# Patient Record
Sex: FEMALE | Race: WHITE | Employment: OTHER | URBAN - METROPOLITAN AREA
[De-identification: names, ages, dates, MRNs, and addresses within clinical notes are randomized per-mention and may not be internally consistent; named-entity substitution may affect disease eponyms.]

---

## 2017-02-20 ENCOUNTER — OFFICE VISIT (OUTPATIENT)
Dept: INTERNAL MEDICINE CLINIC | Facility: CLINIC | Age: 67
End: 2017-02-20

## 2017-02-20 VITALS
WEIGHT: 184 LBS | BODY MASS INDEX: 32.6 KG/M2 | HEART RATE: 81 BPM | HEIGHT: 63 IN | SYSTOLIC BLOOD PRESSURE: 130 MMHG | DIASTOLIC BLOOD PRESSURE: 80 MMHG

## 2017-02-20 DIAGNOSIS — E78.00 HYPERCHOLESTEROLEMIA: ICD-10-CM

## 2017-02-20 DIAGNOSIS — Z11.59 NEED FOR HEPATITIS C SCREENING TEST: ICD-10-CM

## 2017-02-20 DIAGNOSIS — I10 HYPERTENSION, BENIGN: Primary | ICD-10-CM

## 2017-02-20 LAB
ALBUMIN SERPL BCP-MCNC: 3.7 G/DL (ref 3.5–4.8)
ALBUMIN/GLOB SERPL: 1.6 {RATIO} (ref 1–2)
ALP SERPL-CCNC: 64 U/L (ref 32–100)
ALT SERPL-CCNC: 14 U/L (ref 14–54)
ANION GAP SERPL CALC-SCNC: 8 MMOL/L (ref 0–18)
AST SERPL-CCNC: 23 U/L (ref 15–41)
BILIRUB SERPL-MCNC: 0.5 MG/DL (ref 0.3–1.2)
BUN SERPL-MCNC: 16 MG/DL (ref 8–20)
BUN/CREAT SERPL: 17 (ref 10–20)
CALCIUM SERPL-MCNC: 8.8 MG/DL (ref 8.5–10.5)
CHLORIDE SERPL-SCNC: 104 MMOL/L (ref 95–110)
CHOLEST SERPL-MCNC: 196 MG/DL (ref 110–200)
CO2 SERPL-SCNC: 27 MMOL/L (ref 22–32)
CREAT SERPL-MCNC: 0.94 MG/DL (ref 0.5–1.5)
GLOBULIN PLAS-MCNC: 2.3 G/DL (ref 2.5–3.7)
GLUCOSE SERPL-MCNC: 80 MG/DL (ref 70–99)
HDLC SERPL-MCNC: 44 MG/DL
LDLC SERPL CALC-MCNC: 110 MG/DL (ref 0–99)
NONHDLC SERPL-MCNC: 152 MG/DL
OSMOLALITY UR CALC.SUM OF ELEC: 288 MOSM/KG (ref 275–295)
POTASSIUM SERPL-SCNC: 4.1 MMOL/L (ref 3.3–5.1)
PROT SERPL-MCNC: 6 G/DL (ref 5.9–8.4)
SODIUM SERPL-SCNC: 139 MMOL/L (ref 136–144)
TRIGL SERPL-MCNC: 212 MG/DL (ref 1–149)

## 2017-02-20 PROCEDURE — 99213 OFFICE O/P EST LOW 20 MIN: CPT | Performed by: INTERNAL MEDICINE

## 2017-02-20 PROCEDURE — G0463 HOSPITAL OUTPT CLINIC VISIT: HCPCS | Performed by: INTERNAL MEDICINE

## 2017-02-20 PROCEDURE — 36415 COLL VENOUS BLD VENIPUNCTURE: CPT | Performed by: INTERNAL MEDICINE

## 2017-02-20 RX ORDER — LISINOPRIL 20 MG/1
20 TABLET ORAL
Qty: 90 TABLET | Refills: 1 | Status: SHIPPED | OUTPATIENT
Start: 2017-02-20 | End: 2017-09-29

## 2017-02-20 RX ORDER — SIMVASTATIN 10 MG
TABLET ORAL
Qty: 90 TABLET | Refills: 1 | Status: SHIPPED | OUTPATIENT
Start: 2017-02-20 | End: 2017-09-29

## 2017-02-20 NOTE — PROGRESS NOTES
HPI:    Patient ID: Linda Farooq is a 74year old female. HPI  Hypertension  Patient is here for follow up of hypertension. BP at home: doing well  Has been compliant with medications.   Exercise level: exercises 3 times a  week and has been following MG Oral Tab TAKE 1 TABLET BY MOUTH NIGHTLY. Disp: 90 tablet Rfl: 1     Allergies:  Naproxen                    Comment:Other reaction(s): NAPROXEN  PHYSICAL EXAM:    Physical Exam   Vitals reviewed.    Constitutional: She is oriented to person, place, and t

## 2017-02-20 NOTE — PATIENT INSTRUCTIONS
ASSESSMENT/PLAN:   Diagnoses and all orders for this visit:    Hypertension, benign  BP overall doing very well, continue current meds and regular exercise.     Hypercholesterolemia  Cholesterol needs to be rechecked

## 2017-02-21 LAB — HCV AB SERPL QL IA: NONREACTIVE

## 2017-07-07 RX ORDER — SIMVASTATIN 10 MG
TABLET ORAL
Qty: 90 TABLET | Refills: 0 | OUTPATIENT
Start: 2017-07-07

## 2017-07-07 RX ORDER — LISINOPRIL 20 MG/1
TABLET ORAL
Qty: 90 TABLET | Refills: 0 | OUTPATIENT
Start: 2017-07-07

## 2017-07-07 NOTE — TELEPHONE ENCOUNTER
Hypertensive Medications  Protocol Criteria:  · Appointment scheduled in the past 6 months or in the next 3 months  · BMP or CMP in the past 12 months  · Creatinine result < 2  Recent Outpatient Visits            4 months ago Hypertension, benign    [place]

## 2017-07-07 NOTE — TELEPHONE ENCOUNTER
Cholesterol Medications  Protocol Criteria:  · Appointment scheduled in the past 12 months or in the next 3 months  · ALT & LDL on file in the past 12 months  · ALT result < 80  · LDL result <130   Recent Outpatient Visits            4 months ago Hypertens

## 2017-08-21 ENCOUNTER — OFFICE VISIT (OUTPATIENT)
Dept: INTERNAL MEDICINE CLINIC | Facility: CLINIC | Age: 67
End: 2017-08-21

## 2017-08-21 VITALS
DIASTOLIC BLOOD PRESSURE: 76 MMHG | BODY MASS INDEX: 32.6 KG/M2 | HEIGHT: 63 IN | HEART RATE: 58 BPM | SYSTOLIC BLOOD PRESSURE: 136 MMHG | WEIGHT: 184 LBS

## 2017-08-21 DIAGNOSIS — Z01.810 PRE-OPERATIVE CARDIOVASCULAR EXAMINATION: Primary | ICD-10-CM

## 2017-08-21 DIAGNOSIS — I10 HYPERTENSION, BENIGN: ICD-10-CM

## 2017-08-21 DIAGNOSIS — E78.00 HYPERCHOLESTEROLEMIA: ICD-10-CM

## 2017-08-21 DIAGNOSIS — R94.31 ABNORMAL EKG: ICD-10-CM

## 2017-08-21 DIAGNOSIS — S83.262A ACUTE LATERAL MENISCAL TEAR WITH PERIPHERAL DETACHMENT, LEFT, INITIAL ENCOUNTER: ICD-10-CM

## 2017-08-21 PROBLEM — S83.269A ACUTE LATERAL MENISCAL TEAR WITH PERIPHERAL DETACHMENT: Status: ACTIVE | Noted: 2017-08-21

## 2017-08-21 LAB
ALBUMIN SERPL BCP-MCNC: 3.9 G/DL (ref 3.5–4.8)
ALBUMIN/GLOB SERPL: 1.6 {RATIO} (ref 1–2)
ALP SERPL-CCNC: 67 U/L (ref 32–100)
ALT SERPL-CCNC: 17 U/L (ref 14–54)
ANION GAP SERPL CALC-SCNC: 7 MMOL/L (ref 0–18)
AST SERPL-CCNC: 21 U/L (ref 15–41)
BASOPHILS # BLD: 0 K/UL (ref 0–0.2)
BASOPHILS NFR BLD: 1 %
BILIRUB SERPL-MCNC: 0.6 MG/DL (ref 0.3–1.2)
BUN SERPL-MCNC: 17 MG/DL (ref 8–20)
BUN/CREAT SERPL: 17.2 (ref 10–20)
CALCIUM SERPL-MCNC: 8.9 MG/DL (ref 8.5–10.5)
CHLORIDE SERPL-SCNC: 105 MMOL/L (ref 95–110)
CO2 SERPL-SCNC: 28 MMOL/L (ref 22–32)
CREAT SERPL-MCNC: 0.99 MG/DL (ref 0.5–1.5)
EOSINOPHIL # BLD: 0.1 K/UL (ref 0–0.7)
EOSINOPHIL NFR BLD: 2 %
ERYTHROCYTE [DISTWIDTH] IN BLOOD BY AUTOMATED COUNT: 14.4 % (ref 11–15)
GLOBULIN PLAS-MCNC: 2.4 G/DL (ref 2.5–3.7)
GLUCOSE SERPL-MCNC: 83 MG/DL (ref 70–99)
HCT VFR BLD AUTO: 40.9 % (ref 35–48)
HGB BLD-MCNC: 13.6 G/DL (ref 12–16)
LYMPHOCYTES # BLD: 1.5 K/UL (ref 1–4)
LYMPHOCYTES NFR BLD: 31 %
MCH RBC QN AUTO: 29.1 PG (ref 27–32)
MCHC RBC AUTO-ENTMCNC: 33.1 G/DL (ref 32–37)
MCV RBC AUTO: 88 FL (ref 80–100)
MONOCYTES # BLD: 0.3 K/UL (ref 0–1)
MONOCYTES NFR BLD: 7 %
NEUTROPHILS # BLD AUTO: 2.9 K/UL (ref 1.8–7.7)
NEUTROPHILS NFR BLD: 60 %
OSMOLALITY UR CALC.SUM OF ELEC: 291 MOSM/KG (ref 275–295)
PLATELET # BLD AUTO: 178 K/UL (ref 140–400)
PMV BLD AUTO: 7.9 FL (ref 7.4–10.3)
POTASSIUM SERPL-SCNC: 4 MMOL/L (ref 3.3–5.1)
PROT SERPL-MCNC: 6.3 G/DL (ref 5.9–8.4)
RBC # BLD AUTO: 4.66 M/UL (ref 3.7–5.4)
SODIUM SERPL-SCNC: 140 MMOL/L (ref 136–144)
WBC # BLD AUTO: 4.9 K/UL (ref 4–11)

## 2017-08-21 PROCEDURE — 36415 COLL VENOUS BLD VENIPUNCTURE: CPT | Performed by: INTERNAL MEDICINE

## 2017-08-21 PROCEDURE — 99214 OFFICE O/P EST MOD 30 MIN: CPT | Performed by: INTERNAL MEDICINE

## 2017-08-21 PROCEDURE — 93010 ELECTROCARDIOGRAM REPORT: CPT | Performed by: INTERNAL MEDICINE

## 2017-08-21 PROCEDURE — G0463 HOSPITAL OUTPT CLINIC VISIT: HCPCS | Performed by: INTERNAL MEDICINE

## 2017-08-21 PROCEDURE — 93005 ELECTROCARDIOGRAM TRACING: CPT | Performed by: INTERNAL MEDICINE

## 2017-08-21 NOTE — PATIENT INSTRUCTIONS
ASSESSMENT/PLAN:   Diagnoses and all orders for this visit:    Pre-operative cardiovascular examination  -     CBC WITH DIFFERENTIAL WITH PLATELET; Future  -     ELECTROCARDIOGRAM, COMPLETE  -     COMP METABOLIC PANEL (14);  Future  Patient apparently healt

## 2017-08-21 NOTE — PROGRESS NOTES
HPI:    Patient ID: Nelwyn Jesse is a 69year old female. HPI  Pre-operative eval  Patient having left knee pain for the last month, started rather abruptly, went to ortho. MRI shows a lateral meniscal tear. Fluid removed without any relief.    Fanny Diagnosis Date   • Basal cell carcinoma 2003   • Other and unspecified hyperlipidemia    • Unspecified essential hypertension       Past Surgical History:  No date: OTHER      Comment: Excision of basal cell carcinoma with skin                graft    Fa

## 2017-08-25 ENCOUNTER — HOSPITAL ENCOUNTER (OUTPATIENT)
Dept: CV DIAGNOSTICS | Facility: HOSPITAL | Age: 67
Discharge: HOME | End: 2017-08-25
Attending: INTERNAL MEDICINE
Payer: MEDICARE

## 2017-08-25 DIAGNOSIS — R94.31 ABNORMAL EKG: ICD-10-CM

## 2017-08-25 DIAGNOSIS — Z01.810 PRE-OPERATIVE CARDIOVASCULAR EXAMINATION: ICD-10-CM

## 2017-08-25 PROCEDURE — 93306 TTE W/DOPPLER COMPLETE: CPT | Performed by: INTERNAL MEDICINE

## 2017-10-01 RX ORDER — LISINOPRIL 20 MG/1
TABLET ORAL
Qty: 90 TABLET | Refills: 1 | Status: SHIPPED | OUTPATIENT
Start: 2017-10-01 | End: 2018-06-13

## 2017-10-01 RX ORDER — SIMVASTATIN 10 MG
TABLET ORAL
Qty: 90 TABLET | Refills: 1 | Status: SHIPPED | OUTPATIENT
Start: 2017-10-01 | End: 2018-06-13

## 2017-10-10 ENCOUNTER — TELEPHONE (OUTPATIENT)
Dept: INTERNAL MEDICINE CLINIC | Facility: CLINIC | Age: 67
End: 2017-10-10

## 2017-10-10 DIAGNOSIS — Z78.0 MENOPAUSE: ICD-10-CM

## 2017-10-10 DIAGNOSIS — Z12.31 SCREENING MAMMOGRAM, ENCOUNTER FOR: Primary | ICD-10-CM

## 2017-10-10 NOTE — TELEPHONE ENCOUNTER
Wrote order for the mamm/dexa. She needs flu and the old pneumonia (pneumococcal 21).  Can get both of these at the office

## 2017-10-10 NOTE — TELEPHONE ENCOUNTER
Patient is looking for orders for mammogram, dexa scan, flu shot  and pneumonia shot . Call her when order are in .  Patient received a reminder from [employer] that she is due for all this

## 2017-10-11 NOTE — TELEPHONE ENCOUNTER
Pt informed orders for mammo and dexa were written, pt will come in on 10-13-17 for flu and pneumonia 23 vaccine.

## 2017-10-13 ENCOUNTER — TELEPHONE (OUTPATIENT)
Dept: INTERNAL MEDICINE CLINIC | Facility: CLINIC | Age: 67
End: 2017-10-13

## 2017-10-13 ENCOUNTER — MED REC SCAN ONLY (OUTPATIENT)
Dept: INTERNAL MEDICINE CLINIC | Facility: CLINIC | Age: 67
End: 2017-10-13

## 2017-10-13 NOTE — TELEPHONE ENCOUNTER
Pt here for flu and pneumonia vaccine, pt had prevnar 13 on 2-25-15, is she getting pneumonia 21, I need an order please.

## 2017-11-20 ENCOUNTER — HOSPITAL ENCOUNTER (OUTPATIENT)
Dept: BONE DENSITY | Facility: HOSPITAL | Age: 67
Discharge: HOME | End: 2017-11-20
Attending: INTERNAL MEDICINE
Payer: MEDICARE

## 2017-11-20 ENCOUNTER — HOSPITAL ENCOUNTER (OUTPATIENT)
Dept: MAMMOGRAPHY | Facility: HOSPITAL | Age: 67
Discharge: HOME | End: 2017-11-20
Attending: INTERNAL MEDICINE
Payer: MEDICARE

## 2017-11-20 DIAGNOSIS — Z78.0 MENOPAUSE: ICD-10-CM

## 2017-11-20 DIAGNOSIS — Z12.31 SCREENING MAMMOGRAM, ENCOUNTER FOR: ICD-10-CM

## 2017-11-20 PROCEDURE — 77067 SCR MAMMO BI INCL CAD: CPT | Performed by: INTERNAL MEDICINE

## 2017-11-20 PROCEDURE — 77080 DXA BONE DENSITY AXIAL: CPT | Performed by: INTERNAL MEDICINE

## 2017-12-01 ENCOUNTER — OFFICE VISIT (OUTPATIENT)
Dept: INTERNAL MEDICINE CLINIC | Facility: CLINIC | Age: 67
End: 2017-12-01

## 2017-12-01 VITALS
HEIGHT: 63 IN | WEIGHT: 186 LBS | SYSTOLIC BLOOD PRESSURE: 126 MMHG | BODY MASS INDEX: 32.96 KG/M2 | TEMPERATURE: 98 F | OXYGEN SATURATION: 98 % | HEART RATE: 61 BPM | DIASTOLIC BLOOD PRESSURE: 81 MMHG

## 2017-12-01 DIAGNOSIS — I10 HYPERTENSION, BENIGN: Primary | ICD-10-CM

## 2017-12-01 DIAGNOSIS — Z01.818 PRE-OP EVALUATION: ICD-10-CM

## 2017-12-01 PROCEDURE — 99213 OFFICE O/P EST LOW 20 MIN: CPT | Performed by: INTERNAL MEDICINE

## 2017-12-01 PROCEDURE — G0463 HOSPITAL OUTPT CLINIC VISIT: HCPCS | Performed by: INTERNAL MEDICINE

## 2017-12-01 NOTE — PROGRESS NOTES
HPI:    Patient ID: Darion Fay is a 69year old female. HPI   Pre-op eval  Patient was seen in August for pre-op eval but it was canceled due to her pain improving. Now worse, rescheduled her surgery. Benjamin Pal    Her EKG was abnormal but her echo was normal NAPROXEN    HISTORY:  Past Medical History:   Diagnosis Date   • Basal cell carcinoma 2003   • Other and unspecified hyperlipidemia    • Unspecified essential hypertension       Past Surgical History:  No date: OTHER      Comment: Excision of basal cell ca

## 2017-12-01 NOTE — PATIENT INSTRUCTIONS
ASSESSMENT/PLAN:   Diagnoses and all orders for this visit:    Hypertension, benign  BP overall doing well  Pre-op evaluation  Patient was cleared in August for her surgery. She is unchanged and needs no additional eval at this time.  Her EKG was abnormal b

## 2017-12-11 NOTE — H&P
[facility] Patient Status:  Hospital Outpatient Surgery    8/10/1950 MRN [id number]   [place] [address] Attending Shahab Yu MD   [facility] Day # 0 PCP Edison Tomas • Ovarian Cancer Mother 80     Cause of death    • Cancer Paternal Grandfather      Stomach    • Heart Disease Other      Social History:  Smoking status: Never Smoker                                                              Smokeless tobacco: Never

## 2017-12-12 ENCOUNTER — HOSPITAL ENCOUNTER (OUTPATIENT)
Facility: HOSPITAL | Age: 67
Setting detail: HOSPITAL OUTPATIENT SURGERY
Discharge: HOME | End: 2017-12-12
Attending: ORTHOPAEDIC SURGERY | Admitting: ORTHOPAEDIC SURGERY
Payer: MEDICARE

## 2017-12-12 ENCOUNTER — ANESTHESIA (OUTPATIENT)
Dept: SURGERY | Facility: HOSPITAL | Age: 67
End: 2017-12-12
Payer: MEDICARE

## 2017-12-12 ENCOUNTER — SURGERY (OUTPATIENT)
Age: 67
End: 2017-12-12

## 2017-12-12 ENCOUNTER — ANESTHESIA EVENT (OUTPATIENT)
Dept: SURGERY | Facility: HOSPITAL | Age: 67
End: 2017-12-12
Payer: MEDICARE

## 2017-12-12 VITALS
HEART RATE: 64 BPM | HEIGHT: 62.25 IN | TEMPERATURE: 98 F | SYSTOLIC BLOOD PRESSURE: 151 MMHG | BODY MASS INDEX: 33.07 KG/M2 | DIASTOLIC BLOOD PRESSURE: 74 MMHG | RESPIRATION RATE: 16 BRPM | WEIGHT: 182 LBS | OXYGEN SATURATION: 95 %

## 2017-12-12 DIAGNOSIS — M65.9 SYNOVITIS OF LEFT KNEE: ICD-10-CM

## 2017-12-12 DIAGNOSIS — S83.282A ACUTE LATERAL MENISCUS TEAR OF LEFT KNEE: Primary | ICD-10-CM

## 2017-12-12 PROCEDURE — 94010 BREATHING CAPACITY TEST: CPT | Performed by: ORTHOPAEDIC SURGERY

## 2017-12-12 PROCEDURE — 0SBD4ZZ EXCISION OF LEFT KNEE JOINT, PERCUTANEOUS ENDOSCOPIC APPROACH: ICD-10-PCS | Performed by: ORTHOPAEDIC SURGERY

## 2017-12-12 RX ORDER — LIDOCAINE HYDROCHLORIDE 10 MG/ML
INJECTION, SOLUTION EPIDURAL; INFILTRATION; INTRACAUDAL; PERINEURAL AS NEEDED
Status: DISCONTINUED | OUTPATIENT
Start: 2017-12-12 | End: 2017-12-12 | Stop reason: SURG

## 2017-12-12 RX ORDER — MIDAZOLAM HYDROCHLORIDE 1 MG/ML
INJECTION INTRAMUSCULAR; INTRAVENOUS AS NEEDED
Status: DISCONTINUED | OUTPATIENT
Start: 2017-12-12 | End: 2017-12-12 | Stop reason: SURG

## 2017-12-12 RX ORDER — HYDROMORPHONE HYDROCHLORIDE 1 MG/ML
0.2 INJECTION, SOLUTION INTRAMUSCULAR; INTRAVENOUS; SUBCUTANEOUS EVERY 5 MIN PRN
Status: DISCONTINUED | OUTPATIENT
Start: 2017-12-12 | End: 2017-12-12

## 2017-12-12 RX ORDER — SODIUM CHLORIDE, SODIUM LACTATE, POTASSIUM CHLORIDE, CALCIUM CHLORIDE 600; 310; 30; 20 MG/100ML; MG/100ML; MG/100ML; MG/100ML
INJECTION, SOLUTION INTRAVENOUS CONTINUOUS
Status: DISCONTINUED | OUTPATIENT
Start: 2017-12-12 | End: 2017-12-12

## 2017-12-12 RX ORDER — IBUPROFEN 600 MG/1
600 TABLET ORAL EVERY 6 HOURS PRN
Qty: 30 TABLET | Refills: 0 | Status: SHIPPED | OUTPATIENT
Start: 2017-12-12 | End: 2020-01-08

## 2017-12-12 RX ORDER — PRAVASTATIN SODIUM 20 MG
20 TABLET ORAL NIGHTLY
Status: CANCELLED | OUTPATIENT
Start: 2017-12-12

## 2017-12-12 RX ORDER — HYDROCODONE BITARTRATE AND ACETAMINOPHEN 5; 325 MG/1; MG/1
1 TABLET ORAL AS NEEDED
Status: DISCONTINUED | OUTPATIENT
Start: 2017-12-12 | End: 2017-12-12

## 2017-12-12 RX ORDER — ONDANSETRON 2 MG/ML
4 INJECTION INTRAMUSCULAR; INTRAVENOUS ONCE AS NEEDED
Status: DISCONTINUED | OUTPATIENT
Start: 2017-12-12 | End: 2017-12-12

## 2017-12-12 RX ORDER — HYDROMORPHONE HYDROCHLORIDE 1 MG/ML
0.4 INJECTION, SOLUTION INTRAMUSCULAR; INTRAVENOUS; SUBCUTANEOUS EVERY 5 MIN PRN
Status: DISCONTINUED | OUTPATIENT
Start: 2017-12-12 | End: 2017-12-12

## 2017-12-12 RX ORDER — ONDANSETRON 2 MG/ML
4 INJECTION INTRAMUSCULAR; INTRAVENOUS EVERY 6 HOURS PRN
Status: DISCONTINUED | OUTPATIENT
Start: 2017-12-12 | End: 2017-12-12

## 2017-12-12 RX ORDER — ACETAMINOPHEN 500 MG
1000 TABLET ORAL ONCE
Status: COMPLETED | OUTPATIENT
Start: 2017-12-12 | End: 2017-12-12

## 2017-12-12 RX ORDER — HALOPERIDOL 5 MG/ML
0.25 INJECTION INTRAMUSCULAR ONCE AS NEEDED
Status: DISCONTINUED | OUTPATIENT
Start: 2017-12-12 | End: 2017-12-12

## 2017-12-12 RX ORDER — MORPHINE SULFATE 4 MG/ML
4 INJECTION, SOLUTION INTRAMUSCULAR; INTRAVENOUS EVERY 10 MIN PRN
Status: DISCONTINUED | OUTPATIENT
Start: 2017-12-12 | End: 2017-12-12

## 2017-12-12 RX ORDER — HYDROCODONE BITARTRATE AND ACETAMINOPHEN 5; 325 MG/1; MG/1
2 TABLET ORAL AS NEEDED
Status: DISCONTINUED | OUTPATIENT
Start: 2017-12-12 | End: 2017-12-12

## 2017-12-12 RX ORDER — DEXAMETHASONE SODIUM PHOSPHATE 4 MG/ML
VIAL (ML) INJECTION AS NEEDED
Status: DISCONTINUED | OUTPATIENT
Start: 2017-12-12 | End: 2017-12-12 | Stop reason: SURG

## 2017-12-12 RX ORDER — LISINOPRIL 20 MG/1
20 TABLET ORAL DAILY
Status: CANCELLED | OUTPATIENT
Start: 2017-12-12

## 2017-12-12 RX ORDER — ROPIVACAINE HYDROCHLORIDE 5 MG/ML
INJECTION, SOLUTION EPIDURAL; INFILTRATION; PERINEURAL AS NEEDED
Status: DISCONTINUED | OUTPATIENT
Start: 2017-12-12 | End: 2017-12-12 | Stop reason: HOSPADM

## 2017-12-12 RX ORDER — ONDANSETRON 2 MG/ML
INJECTION INTRAMUSCULAR; INTRAVENOUS AS NEEDED
Status: DISCONTINUED | OUTPATIENT
Start: 2017-12-12 | End: 2017-12-12 | Stop reason: SURG

## 2017-12-12 RX ORDER — ASPIRIN 81 MG/1
81 TABLET ORAL DAILY
Status: CANCELLED | OUTPATIENT
Start: 2017-12-12 | End: 2017-12-19

## 2017-12-12 RX ORDER — FAMOTIDINE 20 MG/1
20 TABLET ORAL ONCE
Status: COMPLETED | OUTPATIENT
Start: 2017-12-12 | End: 2017-12-12

## 2017-12-12 RX ORDER — HYDROMORPHONE HYDROCHLORIDE 1 MG/ML
0.6 INJECTION, SOLUTION INTRAMUSCULAR; INTRAVENOUS; SUBCUTANEOUS EVERY 5 MIN PRN
Status: DISCONTINUED | OUTPATIENT
Start: 2017-12-12 | End: 2017-12-12

## 2017-12-12 RX ORDER — METOCLOPRAMIDE 10 MG/1
10 TABLET ORAL ONCE
Status: COMPLETED | OUTPATIENT
Start: 2017-12-12 | End: 2017-12-12

## 2017-12-12 RX ORDER — NALOXONE HYDROCHLORIDE 0.4 MG/ML
80 INJECTION, SOLUTION INTRAMUSCULAR; INTRAVENOUS; SUBCUTANEOUS AS NEEDED
Status: DISCONTINUED | OUTPATIENT
Start: 2017-12-12 | End: 2017-12-12

## 2017-12-12 RX ORDER — HYDROCODONE BITARTRATE AND ACETAMINOPHEN 5; 325 MG/1; MG/1
1 TABLET ORAL EVERY 6 HOURS PRN
Status: CANCELLED | OUTPATIENT
Start: 2017-12-12

## 2017-12-12 RX ORDER — MORPHINE SULFATE 2 MG/ML
2 INJECTION, SOLUTION INTRAMUSCULAR; INTRAVENOUS EVERY 10 MIN PRN
Status: DISCONTINUED | OUTPATIENT
Start: 2017-12-12 | End: 2017-12-12

## 2017-12-12 RX ORDER — CEFAZOLIN SODIUM/WATER 2 G/20 ML
2 SYRINGE (ML) INTRAVENOUS ONCE
Status: COMPLETED | OUTPATIENT
Start: 2017-12-12 | End: 2017-12-12

## 2017-12-12 RX ORDER — IBUPROFEN 600 MG/1
600 TABLET ORAL 2 TIMES DAILY WITH MEALS
Status: CANCELLED | OUTPATIENT
Start: 2017-12-12

## 2017-12-12 RX ORDER — MORPHINE SULFATE 10 MG/ML
6 INJECTION, SOLUTION INTRAMUSCULAR; INTRAVENOUS EVERY 10 MIN PRN
Status: DISCONTINUED | OUTPATIENT
Start: 2017-12-12 | End: 2017-12-12

## 2017-12-12 RX ORDER — HYDROCODONE BITARTRATE AND ACETAMINOPHEN 5; 325 MG/1; MG/1
1 TABLET ORAL EVERY 6 HOURS PRN
Qty: 30 TABLET | Refills: 0 | Status: SHIPPED | OUTPATIENT
Start: 2017-12-12 | End: 2018-08-28

## 2017-12-12 RX ADMIN — SODIUM CHLORIDE, SODIUM LACTATE, POTASSIUM CHLORIDE, CALCIUM CHLORIDE: 600; 310; 30; 20 INJECTION, SOLUTION INTRAVENOUS at 14:50:00

## 2017-12-12 RX ADMIN — ONDANSETRON 4 MG: 2 INJECTION INTRAMUSCULAR; INTRAVENOUS at 14:05:00

## 2017-12-12 RX ADMIN — MIDAZOLAM HYDROCHLORIDE 2 MG: 1 INJECTION INTRAMUSCULAR; INTRAVENOUS at 13:49:00

## 2017-12-12 RX ADMIN — DEXAMETHASONE SODIUM PHOSPHATE 4 MG: 4 MG/ML VIAL (ML) INJECTION at 14:05:00

## 2017-12-12 RX ADMIN — CEFAZOLIN SODIUM/WATER 2 G: 2 G/20 ML SYRINGE (ML) INTRAVENOUS at 14:00:00

## 2017-12-12 RX ADMIN — LIDOCAINE HYDROCHLORIDE 50 MG: 10 INJECTION, SOLUTION EPIDURAL; INFILTRATION; INTRACAUDAL; PERINEURAL at 13:58:00

## 2017-12-12 NOTE — INTERVAL H&P NOTE
Pre-op Diagnosis: left knee lateral meniscus tear    The above referenced H&P was reviewed by Jefferson Bates MD on 12/12/2017, the patient was examined and no significant changes have occurred in the patient's condition since the H&P was performed.   I di

## 2017-12-12 NOTE — BRIEF OP NOTE
Pre-Operative Diagnosis: left knee lateral meniscus tear, synnovitis     Post-Operative Diagnosis: left knee medial and lateral meniscal tears, medial and lateral synovitis     Procedure Performed: Left knee arthroscopy, partial medial/lateral meniscect

## 2017-12-12 NOTE — ANESTHESIA POSTPROCEDURE EVALUATION
Patient: Laura Peres    Procedure Summary     Date:  12/12/17 Room / Location:  [address] [place] [address] MAIN OR  / [address] [place] [address] MAIN OR    Anesthesia Start:  1745 Anesthesia Stop:      Procedure:  KNEE ARTHROSCOPY (Left ) Diagnosis:  (medial and lateral meniscal tear, medial and

## 2017-12-12 NOTE — ANESTHESIA PREPROCEDURE EVALUATION
Anesthesia PreOp Note    HPI:     Kristin Baker is a 69year old female who presents for preoperative consultation requested by: Danica Meadows MD    Date of Surgery: 12/12/2017    Procedure(s):  KNEE ARTHROSCOPY  Indication: left knee lateral meniscu 30 tablet Rfl: 0   HYDROcodone-acetaminophen (NORCO) 5-325 MG Oral Tab Take 1 tablet by mouth every 6 (six) hours as needed for Pain.  Disp: 30 tablet Rfl: 0         Naproxen                    Comment:Other reaction(s): NAPROXEN    Family History   Problem GI/Hepatic/Renal - negative ROS     Endo/Other - negative ROS   Abdominal              Anesthesia Plan:   ASA:  2  Plan:   General  Post-op Pain Management: IV analgesics and Oral pain medication  Informed Consent Plan and Risks Discussed With:  Patient  D

## 2017-12-13 NOTE — OPERATIVE REPORT
[facility]    PATIENT'S NAME: Jonathan Lloyd   ATTENDING PHYSICIAN: Adam Goode MD   OPERATING PHYSICIAN: Isela Goode MD   PATIENT ACCOUNT#:   [id number]    LOCATION:  [facility] 3 [facility] 10  MEDICAL RECORD #:   [id number]       DATE on the right thigh and the end of the table let down. The left leg and knee were then prepped and draped in sterile fashion with ChloraPrep. The leg was exsanguinated. Tourniquet inflated to 300 mmHg. Tourniquet time for the case was 25 minutes.     Por condition. Blood loss was 2 mL. No specimens were sent. No drains were used. No complications were noted. Postoperative instructions were given to her and her family in both written and oral forms.   She will follow up with Dr. Jeanne Hudson in 1 week's missy

## 2017-12-21 ENCOUNTER — PATIENT OUTREACH (OUTPATIENT)
Dept: CASE MANAGEMENT | Age: 67
End: 2017-12-21

## 2017-12-21 NOTE — PROGRESS NOTES
Outreached to patient in regards to enrollment to Chronic Care Management program. Patient is interested in enrolling into our program.    Patient identified with a potential need for Chronic Care Management services.   Called patient to introduce self and

## 2017-12-26 NOTE — PROGRESS NOTES
Contacted pt as her CCM and asked her what day and time might be a good day to do the CCM initial assessment. She said she would like to do tomorrow morning as she is going back to work tomorrow afternoon and tomorrow morning would be ideal for her.  P= Harry

## 2017-12-27 ENCOUNTER — PATIENT OUTREACH (OUTPATIENT)
Dept: CASE MANAGEMENT | Age: 67
End: 2017-12-27

## 2017-12-27 DIAGNOSIS — S83.262A ACUTE LATERAL MENISCAL TEAR WITH PERIPHERAL DETACHMENT, LEFT, INITIAL ENCOUNTER: ICD-10-CM

## 2017-12-27 DIAGNOSIS — I10 HYPERTENSION, BENIGN: ICD-10-CM

## 2017-12-27 DIAGNOSIS — H91.93 BILATERAL HEARING LOSS, UNSPECIFIED HEARING LOSS TYPE: ICD-10-CM

## 2017-12-27 DIAGNOSIS — E78.00 HYPERCHOLESTEROLEMIA: ICD-10-CM

## 2017-12-27 PROCEDURE — 99490 CHRNC CARE MGMT STAFF 1ST 20: CPT | Performed by: INTERNAL MEDICINE

## 2017-12-27 RX ORDER — MULTIVIT-MIN/FOLIC ACID/LUTEIN 400-250MCG
1 TABLET,CHEWABLE ORAL DAILY
COMMUNITY

## 2017-12-27 RX ORDER — FAMOTIDINE 10 MG
10 TABLET ORAL
COMMUNITY

## 2017-12-27 NOTE — PROGRESS NOTES
Spoke to Isela Sanchez at [facility] about CCM, HIPAA verified, current care plan and performed CCM assessment.  Reviewed pt Patient Active Problem List:     Hypertension, benign     Hypercholesterolemia     Personal history of malignant neoplasm of skin     Seborrhe couple of times of week. Ranges from 140//80. She said averages about 135/83. No added salt to diet. She does a combination of baking, frying, broiler. Sheeats a lot of chicken. Hearing Loss both ears. Doesn't have/need hearing aides.  She said s

## 2018-01-11 ENCOUNTER — PATIENT OUTREACH (OUTPATIENT)
Dept: CASE MANAGEMENT | Age: 68
End: 2018-01-11

## 2018-01-11 NOTE — PROGRESS NOTES
Left detailed VM for pt to discuss her monthly CCM outreach. Left detailed contact information for pt to be able to call me back at. P= Await call return.

## 2018-01-16 ENCOUNTER — PATIENT OUTREACH (OUTPATIENT)
Dept: CASE MANAGEMENT | Age: 68
End: 2018-01-16

## 2018-01-16 DIAGNOSIS — E78.00 HYPERCHOLESTEROLEMIA: ICD-10-CM

## 2018-01-16 DIAGNOSIS — S83.262A ACUTE LATERAL MENISCAL TEAR WITH PERIPHERAL DETACHMENT, LEFT, INITIAL ENCOUNTER: ICD-10-CM

## 2018-01-16 DIAGNOSIS — I10 HYPERTENSION, BENIGN: ICD-10-CM

## 2018-01-16 PROCEDURE — 99490 CHRNC CARE MGMT STAFF 1ST 20: CPT

## 2018-01-16 NOTE — PROGRESS NOTES
Left detailed VM to pt with my detailed contact information so that we might touch base for the monthly CCM outreach and assess her goal progress and her overall health concerns. P= Await call return.

## 2018-01-16 NOTE — PROGRESS NOTES
Spoke to Ruby Cuba at [employer] about CCM, HIPAA verified, current care plan and performed CCM assessment.   Patient Active Problem List:     Hypertension, benign     Hypercholesterolemia     Personal history of malignant neoplasm of skin     Seborrheic keratosi she does she said was like 130/80. Her goal with the weight loss is for the BP of course to be lowered too with the weight loss. Notes/Interventions: see above       Goals:   What would you say is your biggest concerns about your health?  My guillermo

## 2018-02-20 ENCOUNTER — PATIENT OUTREACH (OUTPATIENT)
Dept: CASE MANAGEMENT | Age: 68
End: 2018-02-20

## 2018-02-20 NOTE — PROGRESS NOTES
Left detailed VM with pt to complete the monthly CCM with me. I left her my detailed contact information. P= AWait call return.

## 2018-02-23 ENCOUNTER — PATIENT OUTREACH (OUTPATIENT)
Dept: CASE MANAGEMENT | Age: 68
End: 2018-02-23

## 2018-02-23 NOTE — PROGRESS NOTES
Left VM for pt to call me back at her earliest convenience so that we might perform her monthly CCM outreach. P= Await call return.

## 2018-02-23 NOTE — PROGRESS NOTES
Pt called back. She feels at this point she is doing well managing her healthcare needs and she said she would like to opt out of the CCM program. She is aware there is no fee for cancellation and no fee should she ever decide to come back on.  She stated u

## 2018-06-06 ENCOUNTER — TELEPHONE (OUTPATIENT)
Dept: INTERNAL MEDICINE CLINIC | Facility: CLINIC | Age: 68
End: 2018-06-06

## 2018-06-06 DIAGNOSIS — E78.00 HYPERCHOLESTEROLEMIA: Primary | ICD-10-CM

## 2018-06-08 ENCOUNTER — LAB ENCOUNTER (OUTPATIENT)
Dept: LAB | Facility: HOSPITAL | Age: 68
End: 2018-06-08
Attending: INTERNAL MEDICINE
Payer: MEDICARE

## 2018-06-08 DIAGNOSIS — E78.00 HYPERCHOLESTEROLEMIA: ICD-10-CM

## 2018-06-08 PROCEDURE — 36415 COLL VENOUS BLD VENIPUNCTURE: CPT

## 2018-06-08 PROCEDURE — 80061 LIPID PANEL: CPT

## 2018-06-08 PROCEDURE — 80053 COMPREHEN METABOLIC PANEL: CPT

## 2018-06-08 PROCEDURE — 84443 ASSAY THYROID STIM HORMONE: CPT

## 2018-06-13 ENCOUNTER — OFFICE VISIT (OUTPATIENT)
Dept: INTERNAL MEDICINE CLINIC | Facility: CLINIC | Age: 68
End: 2018-06-13

## 2018-06-13 VITALS
WEIGHT: 187 LBS | BODY MASS INDEX: 33.13 KG/M2 | HEIGHT: 63 IN | RESPIRATION RATE: 18 BRPM | HEART RATE: 61 BPM | SYSTOLIC BLOOD PRESSURE: 110 MMHG | TEMPERATURE: 99 F | DIASTOLIC BLOOD PRESSURE: 69 MMHG

## 2018-06-13 DIAGNOSIS — Z12.31 SCREENING MAMMOGRAM, ENCOUNTER FOR: ICD-10-CM

## 2018-06-13 DIAGNOSIS — E78.00 HYPERCHOLESTEROLEMIA: ICD-10-CM

## 2018-06-13 DIAGNOSIS — I10 HYPERTENSION, BENIGN: Primary | ICD-10-CM

## 2018-06-13 PROCEDURE — 99213 OFFICE O/P EST LOW 20 MIN: CPT | Performed by: INTERNAL MEDICINE

## 2018-06-13 PROCEDURE — G0463 HOSPITAL OUTPT CLINIC VISIT: HCPCS | Performed by: INTERNAL MEDICINE

## 2018-06-13 RX ORDER — SIMVASTATIN 20 MG
TABLET ORAL
Qty: 90 TABLET | Refills: 3 | Status: SHIPPED | OUTPATIENT
Start: 2018-06-13 | End: 2019-06-17

## 2018-06-13 RX ORDER — LISINOPRIL 20 MG/1
TABLET ORAL
Qty: 90 TABLET | Refills: 1 | Status: SHIPPED | OUTPATIENT
Start: 2018-06-13 | End: 2018-11-12

## 2018-06-13 NOTE — PATIENT INSTRUCTIONS
ASSESSMENT/PLAN:   Hypertension, benign  BP overall doing very well, continue current meds and regular exercise. Hypercholesterolemia  Reviewed her cholesterol result, she has high triglycerides, should watch her carbs and simple sugars.

## 2018-06-13 NOTE — PROGRESS NOTES
HPI:    Patient ID: Tim Dorman is a 69year old female. HPI  Hypertension  Patient is here for follow up of hypertension. BP at home: doing well. Has been compliant with medications.   Exercise level: somewhat active and has been following low salt 600 MG Oral Tab Take 1 tablet (600 mg total) by mouth every 6 (six) hours as needed for Pain. Disp: 30 tablet Rfl: 0   HYDROcodone-acetaminophen (NORCO) 5-325 MG Oral Tab Take 1 tablet by mouth every 6 (six) hours as needed for Pain.  Disp: 30 tablet Rfl: 0

## 2018-06-13 NOTE — ASSESSMENT & PLAN NOTE
Reviewed her cholesterol result, she has high triglycerides, should watch her carbs and simple sugars.

## 2018-07-29 ENCOUNTER — APPOINTMENT (OUTPATIENT)
Dept: CT IMAGING | Facility: HOSPITAL | Age: 68
End: 2018-07-29
Attending: EMERGENCY MEDICINE
Payer: MEDICARE

## 2018-07-29 ENCOUNTER — HOSPITAL ENCOUNTER (EMERGENCY)
Facility: HOSPITAL | Age: 68
Discharge: HOME | End: 2018-07-29
Attending: EMERGENCY MEDICINE
Payer: MEDICARE

## 2018-07-29 ENCOUNTER — APPOINTMENT (OUTPATIENT)
Dept: GENERAL RADIOLOGY | Facility: HOSPITAL | Age: 68
End: 2018-07-29
Payer: MEDICARE

## 2018-07-29 VITALS
WEIGHT: 185 LBS | TEMPERATURE: 98 F | DIASTOLIC BLOOD PRESSURE: 88 MMHG | HEART RATE: 72 BPM | SYSTOLIC BLOOD PRESSURE: 154 MMHG | BODY MASS INDEX: 34.04 KG/M2 | HEIGHT: 62 IN | RESPIRATION RATE: 18 BRPM | OXYGEN SATURATION: 97 %

## 2018-07-29 DIAGNOSIS — S00.83XA FACIAL HEMATOMA, INITIAL ENCOUNTER: ICD-10-CM
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
## 2018-07-29 DIAGNOSIS — M79.89 SWELLING OF RIGHT HAND: ICD-10-CM

## 2018-07-29 PROCEDURE — 70486 CT MAXILLOFACIAL W/O DYE: CPT | Performed by: EMERGENCY MEDICINE

## 2018-07-29 PROCEDURE — 70450 CT HEAD/BRAIN W/O DYE: CPT | Performed by: EMERGENCY MEDICINE

## 2018-07-29 PROCEDURE — 99284 EMERGENCY DEPT VISIT MOD MDM: CPT

## 2018-07-29 PROCEDURE — 73130 X-RAY EXAM OF HAND: CPT | Performed by: EMERGENCY MEDICINE

## 2018-07-30 ENCOUNTER — TELEPHONE (OUTPATIENT)
Dept: INTERNAL MEDICINE CLINIC | Facility: CLINIC | Age: 68
End: 2018-07-30

## 2018-07-30 NOTE — TELEPHONE ENCOUNTER
Patient calling was in [address] [place] [address] yesterday due to a fall patient states nothing is broken only bruised patient wants to know do you want her to follow up ?

## 2018-07-30 NOTE — TELEPHONE ENCOUNTER
If she is getting better, doesn't need to make an appointment.  If any question, we are happy to see her

## 2018-07-30 NOTE — ED PROVIDER NOTES
Patient Seen in: [facility]    History   Patient presents with:  Fall (musculoskeletal, neurologic)    Stated Complaint: Fall (tripped)    HPI    12-year-old female with history of hypertension, hyperlipidemia, not on blood th nausea and vomiting. Genitourinary: Negative for dysuria, flank pain and frequency. Musculoskeletal: Positive for arthralgias and joint swelling. Negative for back pain. Skin: Negative for rash.    Neurological: Negative for weakness, light-headedness thumb, distal right wrist unremarkable, right elbow unremarkable   Neurological: She is alert and oriented to person, place, and time.    5/5 strength in b/l UEs and LEs, normal sensation in all extremities, normal finger to nose b/l, normal gait, no facial personally reviewed the labs and imaging and found XR without acute fracture, CT without acute ICH, subtle chronic nasal bridge/maxillary nasal process fracture  - pain meds offered, pt declining  - repeat exam without nasal bridge tenderness  - supportive [phone]    Schedule an appointment as soon as possible for a visit in 2 days  As needed    We recommend that you schedule follow up care with a primary care provider within the next three months to obtain basic health screening including reasse

## 2018-07-30 NOTE — ED INITIAL ASSESSMENT (HPI)
Pt states that she had mechanical [place] airport and has pain to right hand and right wrist.  There is bruising and ecchymosis to both areas.

## 2018-08-20 ENCOUNTER — TELEPHONE (OUTPATIENT)
Dept: INTERNAL MEDICINE CLINIC | Facility: CLINIC | Age: 68
End: 2018-08-20

## 2018-08-20 NOTE — TELEPHONE ENCOUNTER
She may need PT, but she needs an evaluation first, we need to be sure there isn't something else going on

## 2018-08-20 NOTE — TELEPHONE ENCOUNTER
Patient called, she has had a few falls over the summer, the first time she hit her head and the second she had a skinned knee.  Patient is requesting order for physical therapy for strengthening balance

## 2018-08-28 ENCOUNTER — OFFICE VISIT (OUTPATIENT)
Dept: INTERNAL MEDICINE CLINIC | Facility: CLINIC | Age: 68
End: 2018-08-28
Payer: MEDICARE

## 2018-08-28 VITALS
DIASTOLIC BLOOD PRESSURE: 80 MMHG | TEMPERATURE: 99 F | WEIGHT: 190 LBS | SYSTOLIC BLOOD PRESSURE: 135 MMHG | BODY MASS INDEX: 33.66 KG/M2 | HEART RATE: 66 BPM | HEIGHT: 63 IN | RESPIRATION RATE: 18 BRPM

## 2018-08-28 DIAGNOSIS — R29.6 RECURRENT FALLS: Primary | ICD-10-CM

## 2018-08-28 PROCEDURE — 99213 OFFICE O/P EST LOW 20 MIN: CPT | Performed by: INTERNAL MEDICINE

## 2018-08-28 RX ORDER — DICLOFENAC SODIUM 75 MG/1
TABLET, DELAYED RELEASE ORAL
Refills: 1 | COMMUNITY
Start: 2018-06-19 | End: 2020-01-08

## 2018-08-28 NOTE — PATIENT INSTRUCTIONS
Recurrent falls  (primary encounter diagnosis)/ unsteady gait -recent CT in July was negative, she did have eye exam which was normal will order PT OT to work on her balance,could be related to oa of her knee, fall precautions, if not better follow up

## 2018-08-28 NOTE — PROGRESS NOTES
HPI:    Patient ID: Demar Justin is a 77year old female. HPI   Reccurent falls . She came in to discuss today to discuss her recurrent falls. She states that it happened in July  while she was trying to catch a plane in [place] .  at that time she d and sleep disturbance. The patient is not nervous/anxious.             Current Outpatient Prescriptions:  Diclofenac Sodium 75 MG Oral Tab EC TAKE 1 TABLET BY MOUTH AS NEEDED FOR PAIN MAX 2 TABS DAILY Disp:  Rfl: 1   simvastatin 20 MG Oral Tab TAKE 1 TABLET person, place, and time. She appears well-developed and well-nourished. No distress. HENT:   Head: Normocephalic and atraumatic. Right Ear: Tympanic membrane and external ear normal. No drainage or tenderness. No mastoid tenderness.  Tympanic membrane i normal strength and normal reflexes. No cranial nerve deficit or sensory deficit. She exhibits normal muscle tone. She displays a negative Romberg sign.  Coordination and gait normal.   Reflex Scores:       Tricep reflexes are 2+ on the right side and 2+ on

## 2018-09-04 ENCOUNTER — OFFICE VISIT (OUTPATIENT)
Dept: PHYSICAL THERAPY | Age: 68
End: 2018-09-04
Attending: INTERNAL MEDICINE
Payer: MEDICARE

## 2018-09-04 DIAGNOSIS — R29.6 RECURRENT FALLS: ICD-10-CM

## 2018-09-04 PROCEDURE — 97162 PT EVAL MOD COMPLEX 30 MIN: CPT

## 2018-09-04 PROCEDURE — 97530 THERAPEUTIC ACTIVITIES: CPT

## 2018-09-04 PROCEDURE — 97112 NEUROMUSCULAR REEDUCATION: CPT

## 2018-09-04 NOTE — PROGRESS NOTES
SPINE EVALUATION:   Referring Physician: Dr. Art Merida  Date of Onset: 8/28/2018 Date of Service: 9/4/2018   Diagnosis: Recurrent falls (R29.6);  Unsteadiness on feet (R26.81)  PATIENT SUMMARY:   José Miguel Fernandez is a 77year old y/o female who presents to  Yes/No Comments   Age over 48 yes    Bowel or bladder Dysfunction/Saddle Anesthesia no    History of Cancer yes Skin cancer   Immune Suppression no    History of Trauma yes 2 falls (broken nose; L wrist fx)   Night Pain, Unexplained Weight Loss, Fever or C - Abbreviated ABC score: 26.7% (73.3% functional limitation)  - Dynamic Gait Index: 20/24 (16.7% functional limitation); score of < or = 19/24 is predictive of falls in the elderly      LE STRENGTH:   -/5 MMT   9/4/2018 Comments   Hip Flexion (L2) R 5, L limitation)  Abbreviated ABC score: 26.7% (73.3% functional limitation)  Dynamic Gait Index: 20/24 (16.7% functional limitation); score of < or = 19/24 is predictive of falls in the elderly    Current status G Code: Initial, Mobility: Walking and Moving Ar

## 2018-09-07 ENCOUNTER — OFFICE VISIT (OUTPATIENT)
Dept: PHYSICAL THERAPY | Age: 68
End: 2018-09-07
Attending: INTERNAL MEDICINE
Payer: MEDICARE

## 2018-09-07 PROCEDURE — 97110 THERAPEUTIC EXERCISES: CPT

## 2018-09-07 PROCEDURE — 97112 NEUROMUSCULAR REEDUCATION: CPT

## 2018-09-07 NOTE — PROGRESS NOTES
Referring Physician: Dr. Thuy La MD  Dx:     Recurrent falls (R29.6);  Unsteadiness on feet (R26.81)     Authorized # of Visits:  8/44 (Medicare; Cert ends - 21/5/1934)        Next MD visit: none scheduled  Fall Risk: standard         Precautions: n/

## 2018-09-12 ENCOUNTER — OFFICE VISIT (OUTPATIENT)
Dept: PHYSICAL THERAPY | Age: 68
End: 2018-09-12
Attending: INTERNAL MEDICINE
Payer: MEDICARE

## 2018-09-12 DIAGNOSIS — R29.6 RECURRENT FALLS: ICD-10-CM

## 2018-09-12 PROCEDURE — 97112 NEUROMUSCULAR REEDUCATION: CPT

## 2018-09-12 PROCEDURE — 97110 THERAPEUTIC EXERCISES: CPT

## 2018-09-12 NOTE — PROGRESS NOTES
Referring Physician: Dr. Gianluca Colon MD  Dx:     Recurrent falls (R29.6);  Unsteadiness on feet (R26.81)     Authorized # of Visits:  5/58 (Medicare; Cert ends - 46/1/3614)        Next MD visit: none scheduled  Fall Risk: standard         Precautions: n/ community settings and for self management of prophylactic care. 2. Patient will improve her abbreviated ABC score to at least 70% to increase her confidence for gait.   3. Patient will have at least 4+/5 LE strength to ambulate community distances with ad

## 2018-09-14 ENCOUNTER — TELEPHONE (OUTPATIENT)
Dept: PHYSICAL THERAPY | Age: 68
End: 2018-09-14

## 2018-09-19 ENCOUNTER — OFFICE VISIT (OUTPATIENT)
Dept: PHYSICAL THERAPY | Age: 68
End: 2018-09-19
Attending: INTERNAL MEDICINE
Payer: MEDICARE

## 2018-09-19 DIAGNOSIS — R29.6 RECURRENT FALLS: ICD-10-CM

## 2018-09-19 PROCEDURE — 97112 NEUROMUSCULAR REEDUCATION: CPT

## 2018-09-19 PROCEDURE — 97110 THERAPEUTIC EXERCISES: CPT

## 2018-09-19 NOTE — PROGRESS NOTES
Referring Physician: Dr. Patsy Carson MD  Dx:     Recurrent falls (R29.6);  Unsteadiness on feet (R26.81)     Authorized # of Visits:  6/69 (Medicare; Cert ends - 91/5/6725)        Next MD visit: none scheduled  Fall Risk: standard         Precautions: n/ 10x2, B  - standing on foampad; marchinxB without UE support  - standing; heel raises; 10x2, B  - lateral steppin'x3 laps  - Romberg on foampad with EC: 30\"x 3  - Monster walk: 20'x 3 laps  - fwd lunges onto foampad; 10x2, B       Assessment: Co

## 2018-09-20 ENCOUNTER — APPOINTMENT (OUTPATIENT)
Dept: PHYSICAL THERAPY | Age: 68
End: 2018-09-20
Attending: INTERNAL MEDICINE
Payer: MEDICARE

## 2018-09-21 ENCOUNTER — OFFICE VISIT (OUTPATIENT)
Dept: PHYSICAL THERAPY | Age: 68
End: 2018-09-21
Attending: INTERNAL MEDICINE
Payer: MEDICARE

## 2018-09-21 PROCEDURE — 97112 NEUROMUSCULAR REEDUCATION: CPT

## 2018-09-21 PROCEDURE — 97110 THERAPEUTIC EXERCISES: CPT

## 2018-09-21 NOTE — PROGRESS NOTES
Referring Physician: Dr. Rosa Aguayo MD  Dx:     Recurrent falls (R29.6);  Unsteadiness on feet (R26.81)     Authorized # of Visits:  1/16 (Medicare; Cert ends - 66/9/3994)        Next MD visit: none scheduled  Fall Risk: standard         Precautions: n/ abd; 10x2,B  - standing; marchinxB  - standing; heel raises; 10x2, B  - standing: toe raises: 10x2, B  - lateral stepping along barre: x 5 laps  - Romberg on foampad with EO: 60\"x1  - Romberg on foampad with EC: 30\"x 3  - Monster walk: 20'x 3 laps -

## 2018-09-25 ENCOUNTER — OFFICE VISIT (OUTPATIENT)
Dept: PHYSICAL THERAPY | Age: 68
End: 2018-09-25
Attending: INTERNAL MEDICINE
Payer: MEDICARE

## 2018-09-25 ENCOUNTER — IMMUNIZATION (OUTPATIENT)
Dept: INTERNAL MEDICINE CLINIC | Facility: CLINIC | Age: 68
End: 2018-09-25
Payer: MEDICARE

## 2018-09-25 DIAGNOSIS — R29.6 RECURRENT FALLS: ICD-10-CM

## 2018-09-25 PROCEDURE — 97112 NEUROMUSCULAR REEDUCATION: CPT

## 2018-09-25 PROCEDURE — 90653 IIV ADJUVANT VACCINE IM: CPT | Performed by: INTERNAL MEDICINE

## 2018-09-25 PROCEDURE — 97110 THERAPEUTIC EXERCISES: CPT

## 2018-09-25 PROCEDURE — G0008 ADMIN INFLUENZA VIRUS VAC: HCPCS | Performed by: INTERNAL MEDICINE

## 2018-09-25 NOTE — PROGRESS NOTES
Referring Physician: Dr. Tabitha Aly MD  Dx:     Recurrent falls (R29.6);  Unsteadiness on feet (R26.81)     Authorized # of Visits:  8/89 (Medicare; Cert ends - 21/5/6216)        Next MD visit: none scheduled  Fall Risk: standard         Precautions: n/ Romberg on foampad with EC: 30\"x 3  - Monster walk: 20'x 3 laps - standing: toe taps hip abd; 10x2,B  - standing: toe taps hip ext; 10x2, B  - standing on foampad; marchinxB without UE support  - standing; heel raises; 10x2, B  - lateral steppin Re-ed x2     Total Timed Treatment: 45 min  Total Treatment Time: 45 min

## 2018-09-28 ENCOUNTER — OFFICE VISIT (OUTPATIENT)
Dept: PHYSICAL THERAPY | Age: 68
End: 2018-09-28
Attending: INTERNAL MEDICINE
Payer: MEDICARE

## 2018-09-28 DIAGNOSIS — R29.6 RECURRENT FALLS: ICD-10-CM

## 2018-09-28 PROCEDURE — 97112 NEUROMUSCULAR REEDUCATION: CPT

## 2018-09-28 PROCEDURE — 97110 THERAPEUTIC EXERCISES: CPT

## 2018-09-28 NOTE — PROGRESS NOTES
Patient Name: Angela Del Valle  YOB: 1950          MRN number:  [id number]  Date:  9/28/2018  Referring Physician:  Ryder Mann   Referring Physician: Dr. Ryder Mann MD  Dx:     Recurrent falls (R29.6);  Unsteadiness on feet (R26.81)     A 5   Knee Flexion (S2) R 5, L 4 R 5, L 5   Ankle DF (L4) R 5, L 5 R 5, L 5   EHL (L5) R 5, L 5 R 5, L 5   Ankle PF (S1) R 5, L 4 R 5, L 5    Hip Abduction R 4, L 3+ R 5, L 4+    Hip Extension R 3 -, L 3 - R 4, L 4          LE ROM: measured in degrees    9/4 agreed to actively participate in planning and for this course of care. Thank you for your referral. Please co-sign or sign and return this letter via fax as soon as possible to [phone].  If you have any questions, please contact me at Dept: [phone] 10x2,B  - standing: toe taps hip ext; 10x2, B  - standing on foampad; marchinxB without UE support  - standing; heel raises; 10x2, B  - lateral steppin'x3 laps  - Romberg on foampad with EC: 30\"x 3  - Monster walk: 20'x 3 laps  - fwd lunges onto

## 2018-11-13 RX ORDER — LISINOPRIL 20 MG/1
TABLET ORAL
Qty: 90 TABLET | Refills: 1 | Status: SHIPPED | OUTPATIENT
Start: 2018-11-13 | End: 2019-04-02

## 2018-12-22 ENCOUNTER — HOSPITAL ENCOUNTER (OUTPATIENT)
Dept: MAMMOGRAPHY | Facility: HOSPITAL | Age: 68
Discharge: HOME | End: 2018-12-22
Attending: INTERNAL MEDICINE
Payer: MEDICARE

## 2018-12-22 DIAGNOSIS — Z12.31 SCREENING MAMMOGRAM, ENCOUNTER FOR: ICD-10-CM

## 2018-12-22 PROCEDURE — 77067 SCR MAMMO BI INCL CAD: CPT | Performed by: INTERNAL MEDICINE

## 2018-12-22 PROCEDURE — 77063 BREAST TOMOSYNTHESIS BI: CPT | Performed by: INTERNAL MEDICINE

## 2019-01-09 ENCOUNTER — OFFICE VISIT (OUTPATIENT)
Dept: INTERNAL MEDICINE CLINIC | Facility: CLINIC | Age: 69
End: 2019-01-09
Payer: MEDICARE

## 2019-01-09 VITALS
BODY MASS INDEX: 34.91 KG/M2 | TEMPERATURE: 98 F | HEART RATE: 64 BPM | RESPIRATION RATE: 18 BRPM | SYSTOLIC BLOOD PRESSURE: 130 MMHG | DIASTOLIC BLOOD PRESSURE: 80 MMHG | HEIGHT: 63 IN | WEIGHT: 197 LBS

## 2019-01-09 DIAGNOSIS — E78.00 HYPERCHOLESTEREMIA: Primary | ICD-10-CM

## 2019-01-09 PROCEDURE — 99213 OFFICE O/P EST LOW 20 MIN: CPT | Performed by: INTERNAL MEDICINE

## 2019-01-09 NOTE — PROGRESS NOTES
HPI:    Patient ID: Anupama Boyce is a 77year old female. HPI   follow up on htn   She is checking her blood pressure at home and according to her is running around 130/80.  She is watching her diet and take her medication regularly   BP Readings from tablet Rfl: 1   Diclofenac Sodium 75 MG Oral Tab EC TAKE 1 TABLET BY MOUTH AS NEEDED FOR PAIN MAX 2 TABS DAILY Disp:  Rfl: 1   simvastatin 20 MG Oral Tab TAKE 1 TABLET EVERY NIGHT Disp: 90 tablet Rfl: 3   multiple vitamin Oral Chew Tab Chew 1 tablet by [place] Paternal Cousin Female    • No Known Problems Paternal Cousin Male    • Breast Cancer Neg    • DCIS Neg    • LCIS Neg    • BRCA gene + Neg    • Ashkenazi Nondenominational Descent Neg       Social History: Social History    Tobacco Use      Smoking status: Never Smok sounds are normal. She exhibits no shifting dullness, no distension and no mass. There is no hepatosplenomegaly. There is no tenderness. There is no rigidity, no rebound, no guarding and no CVA tenderness. Musculoskeletal: Normal range of motion.  She exh

## 2019-01-09 NOTE — PATIENT INSTRUCTIONS
htn controlled , continue with current meds , advised her to check blood pressure at home and bring log book next week , advised her for low salt diet      osteoarthritis of the knee- advised her to lose weight ,she is following with ortho , planing for nam

## 2019-04-03 RX ORDER — LISINOPRIL 20 MG/1
TABLET ORAL
Qty: 90 TABLET | Refills: 1 | Status: SHIPPED | OUTPATIENT
Start: 2019-04-03 | End: 2019-11-20

## 2019-04-12 ENCOUNTER — APPOINTMENT (OUTPATIENT)
Dept: LAB | Facility: HOSPITAL | Age: 69
End: 2019-04-12
Attending: INTERNAL MEDICINE
Payer: MEDICARE

## 2019-04-12 DIAGNOSIS — E78.00 HYPERCHOLESTEREMIA: ICD-10-CM

## 2019-04-12 PROCEDURE — 80061 LIPID PANEL: CPT

## 2019-04-12 PROCEDURE — 36415 COLL VENOUS BLD VENIPUNCTURE: CPT

## 2019-05-31 ENCOUNTER — TELEPHONE (OUTPATIENT)
Dept: INTERNAL MEDICINE CLINIC | Facility: CLINIC | Age: 69
End: 2019-05-31

## 2019-06-18 RX ORDER — SIMVASTATIN 20 MG
TABLET ORAL
Qty: 90 TABLET | Refills: 1 | Status: SHIPPED | OUTPATIENT
Start: 2019-06-18 | End: 2020-02-06

## 2019-06-18 NOTE — TELEPHONE ENCOUNTER
Please review; protocol failed.     Requested Prescriptions     Pending Prescriptions Disp Refills   • SIMVASTATIN 20 MG Oral Tab [Pharmacy Med Name: SIMVASTATIN 20 MG Tablet] 90 tablet 3     Sig: TAKE 1 TABLET EVERY NIGHT         Recent Visits  Date Type P

## 2019-06-19 ENCOUNTER — OFFICE VISIT (OUTPATIENT)
Dept: INTERNAL MEDICINE CLINIC | Facility: CLINIC | Age: 69
End: 2019-06-19
Payer: MEDICARE

## 2019-06-19 ENCOUNTER — HOSPITAL ENCOUNTER (OUTPATIENT)
Dept: GENERAL RADIOLOGY | Facility: HOSPITAL | Age: 69
Discharge: HOME | End: 2019-06-19
Attending: INTERNAL MEDICINE
Payer: MEDICARE

## 2019-06-19 VITALS
RESPIRATION RATE: 18 BRPM | HEART RATE: 78 BPM | DIASTOLIC BLOOD PRESSURE: 78 MMHG | WEIGHT: 195 LBS | HEIGHT: 63 IN | SYSTOLIC BLOOD PRESSURE: 126 MMHG | BODY MASS INDEX: 34.55 KG/M2 | TEMPERATURE: 98 F

## 2019-06-19 DIAGNOSIS — M17.12 PRIMARY OSTEOARTHRITIS OF LEFT KNEE: ICD-10-CM

## 2019-06-19 DIAGNOSIS — Z01.810 PREOP CARDIOVASCULAR EXAM: Primary | ICD-10-CM

## 2019-06-19 DIAGNOSIS — Z01.818 PRE-OP EXAM: ICD-10-CM

## 2019-06-19 PROCEDURE — 71046 X-RAY EXAM CHEST 2 VIEWS: CPT | Performed by: INTERNAL MEDICINE

## 2019-06-19 PROCEDURE — 93000 ELECTROCARDIOGRAM COMPLETE: CPT | Performed by: INTERNAL MEDICINE

## 2019-06-19 PROCEDURE — 99214 OFFICE O/P EST MOD 30 MIN: CPT | Performed by: INTERNAL MEDICINE

## 2019-06-19 NOTE — PROGRESS NOTES
Anupama Boyce is a 77year old female who presents for a pre-operative physical exam. Patient is to have  Left knee arthroplasty , to be done by Darwin Morris , at [facility] on 7/17/2019  Pt has had previous anesthesia:  Yes.   Previous complic Known Problems Sister    • No Known Problems Daughter    • No Known Problems Maternal Grandmother    • No Known Problems Paternal Grandmother    • No Known Problems Maternal Aunt    • No Known Problems Paternal Aunt    • No Known Problems Maternal Cousin F adenopathy,no bruits  CHEST: no chest tenderness  BREAST: no dominant or suspicious mass  LUNGS: clear to auscultation  CARDIO: RRR without murmur  GI: good BS's,no masses, HSM or tenderness  : deferred  EXTREMITIES: edema  NEURO: Oriented times three,cr

## 2019-06-21 ENCOUNTER — LAB ENCOUNTER (OUTPATIENT)
Dept: LAB | Facility: HOSPITAL | Age: 69
End: 2019-06-21
Attending: ORTHOPAEDIC SURGERY
Payer: MEDICARE

## 2019-06-21 DIAGNOSIS — Z01.818 PRE-OP EXAM: ICD-10-CM

## 2019-06-21 PROCEDURE — 85025 COMPLETE CBC W/AUTO DIFF WBC: CPT

## 2019-06-21 PROCEDURE — 80053 COMPREHEN METABOLIC PANEL: CPT

## 2019-06-21 PROCEDURE — 85610 PROTHROMBIN TIME: CPT

## 2019-06-21 PROCEDURE — 36415 COLL VENOUS BLD VENIPUNCTURE: CPT

## 2019-06-21 PROCEDURE — 87081 CULTURE SCREEN ONLY: CPT

## 2019-06-25 ENCOUNTER — APPOINTMENT (OUTPATIENT)
Dept: LAB | Facility: HOSPITAL | Age: 69
End: 2019-06-25
Attending: INTERNAL MEDICINE
Payer: MEDICARE

## 2019-06-25 ENCOUNTER — TELEPHONE (OUTPATIENT)
Dept: INTERNAL MEDICINE CLINIC | Facility: CLINIC | Age: 69
End: 2019-06-25

## 2019-06-25 DIAGNOSIS — D50.8 OTHER IRON DEFICIENCY ANEMIA: ICD-10-CM

## 2019-06-25 PROCEDURE — 83540 ASSAY OF IRON: CPT

## 2019-06-25 PROCEDURE — 36415 COLL VENOUS BLD VENIPUNCTURE: CPT

## 2019-06-25 PROCEDURE — 84466 ASSAY OF TRANSFERRIN: CPT

## 2019-07-24 ENCOUNTER — MED REC SCAN ONLY (OUTPATIENT)
Dept: INTERNAL MEDICINE CLINIC | Facility: CLINIC | Age: 69
End: 2019-07-24

## 2019-08-26 ENCOUNTER — OFFICE VISIT (OUTPATIENT)
Dept: INTERNAL MEDICINE CLINIC | Facility: CLINIC | Age: 69
End: 2019-08-26
Payer: MEDICARE

## 2019-08-26 VITALS
HEIGHT: 63 IN | RESPIRATION RATE: 18 BRPM | BODY MASS INDEX: 34.37 KG/M2 | DIASTOLIC BLOOD PRESSURE: 77 MMHG | HEART RATE: 71 BPM | SYSTOLIC BLOOD PRESSURE: 120 MMHG | TEMPERATURE: 98 F | WEIGHT: 194 LBS

## 2019-08-26 DIAGNOSIS — D50.8 OTHER IRON DEFICIENCY ANEMIA: ICD-10-CM

## 2019-08-26 DIAGNOSIS — Z00.00 MEDICARE ANNUAL WELLNESS VISIT, SUBSEQUENT: Primary | ICD-10-CM

## 2019-08-26 DIAGNOSIS — Z00.00 ENCOUNTER FOR ANNUAL HEALTH EXAMINATION: ICD-10-CM

## 2019-08-26 DIAGNOSIS — Z12.31 ENCOUNTER FOR SCREENING MAMMOGRAM FOR BREAST CANCER: ICD-10-CM

## 2019-08-26 PROBLEM — D50.9 IRON DEFICIENCY ANEMIA: Status: ACTIVE | Noted: 2019-08-26

## 2019-08-26 PROBLEM — S83.269A ACUTE LATERAL MENISCAL TEAR WITH PERIPHERAL DETACHMENT: Status: RESOLVED | Noted: 2017-08-21 | Resolved: 2019-08-26

## 2019-08-26 PROCEDURE — G0439 PPPS, SUBSEQ VISIT: HCPCS | Performed by: INTERNAL MEDICINE

## 2019-08-26 RX ORDER — FERROUS SULFATE 325(65) MG
325 TABLET ORAL
Qty: 90 TABLET | Refills: 0 | Status: SHIPPED | OUTPATIENT
Start: 2019-08-26 | End: 2020-01-28

## 2019-08-26 NOTE — PATIENT INSTRUCTIONS
[address] SCREENING SCHEDULE   Tests on this list are recommended by your physician but may not be covered, or covered at this frequency, by your insurer. Please check with your insurance carrier before scheduling to verify coverage.    PREVENTATI if abnormal Colonoscopy due on 10/13/2020 Update [facility] if applicable    Flex Sigmoidoscopy Screen  Covered every 5 years No results found for this or any previous visit. No flowsheet data found.      Fecal Occult Blood   Covered Annually No res performed in visit on 02/24/15   • PNEUMOCOCCAL VACC, 13 JORGE IM    Please get once after your 65th birthday    Pneumococcal 23 (Pneumovax)  Covered Once after 65 Orders placed or performed in visit on 10/13/17   • PNEUMOCOCCAL IMM (PNEUMOVAX)    Please get

## 2019-08-26 NOTE — PROGRESS NOTES
HPI:   Aramis Quintero is a 68year old female who presents for a Medicare Subsequent Annual Wellness visit (Pt already had Initial Annual Wellness).     Her last annual assessment has been over 1 year: Annual Physical due on 12/01/2018         Fall/Risk As Seborrheic keratosis     Hearing loss     Colon polyp     Acute lateral meniscal tear with peripheral detachment    Wt Readings from Last 3 Encounters:  08/26/19 : 194 lb (88 kg)  06/19/19 : 195 lb (88.5 kg)  01/09/19 : 197 lb (89.4 kg)     Last Cholest She  reports that she has never smoked. She has never used smokeless tobacco. She reports that she does not drink alcohol or use drugs.      REVIEW OF SYSTEMS:   Review of Systems   GENERAL: feels well otherwise  SKIN: denies any unusual skin lesions  EYES: Radial pulses are 2+ on the right side, and 2+ on the left side. Femoral pulses are 2+ on the right side, and 2+ on the left side. Popliteal pulses are 2+ on the right side, and 2+ on the left side.         Dorsalis pedis pulses are 2+ on PLAN SUMMARY:   Diagnoses and all orders for this visit:    Encounter for screening mammogram for breast cancer  -     DEVANG SCREENING BILAT (CPT=77067);  Future     Hypertension, benign controlled , advised her to continue with current meds, check bp at home EKG - w/ Initial Preventative Physical Exam only, or if medically necessary Electrocardiogram date06/19/2019     Colorectal Cancer Screening      Colonoscopy Screen every 10 years Colonoscopy due on 10/13/2020 Update Health Maintenance if applicable    Fl Only covered with a cut with metal- TD and TDaP Not covered by Medicare Part B) No vaccine history found This may be covered with your prescription benefits, but Medicare does not cover unless Medically needed    Zoster   Not covered by Medicare Part B No

## 2019-11-05 ENCOUNTER — NURSE ONLY (OUTPATIENT)
Dept: INTERNAL MEDICINE CLINIC | Facility: CLINIC | Age: 69
End: 2019-11-05
Payer: MEDICARE

## 2019-11-05 DIAGNOSIS — Z23 NEED FOR VACCINATION: ICD-10-CM

## 2019-11-05 PROCEDURE — 90662 IIV NO PRSV INCREASED AG IM: CPT | Performed by: INTERNAL MEDICINE

## 2019-11-05 PROCEDURE — G0008 ADMIN INFLUENZA VIRUS VAC: HCPCS | Performed by: INTERNAL MEDICINE

## 2019-11-20 RX ORDER — LISINOPRIL 20 MG/1
TABLET ORAL
Qty: 90 TABLET | Refills: 1 | Status: SHIPPED | OUTPATIENT
Start: 2019-11-20 | End: 2020-09-09

## 2019-11-21 NOTE — TELEPHONE ENCOUNTER
Refill passed per [facility], [facility] protocol.   Hypertensive Medications  Protocol Criteria:  · Appointment scheduled in the past 6 months or in the next 3 months  · BMP or CMP in the past 12 months  · Creatinine result < 2  Recent Outpatient Visits

## 2019-12-24 ENCOUNTER — LAB ENCOUNTER (OUTPATIENT)
Dept: LAB | Facility: HOSPITAL | Age: 69
End: 2019-12-24
Attending: INTERNAL MEDICINE
Payer: MEDICARE

## 2019-12-24 ENCOUNTER — HOSPITAL ENCOUNTER (OUTPATIENT)
Dept: MAMMOGRAPHY | Facility: HOSPITAL | Age: 69
Discharge: HOME | End: 2019-12-24
Attending: INTERNAL MEDICINE
Payer: MEDICARE

## 2019-12-24 DIAGNOSIS — D50.8 OTHER IRON DEFICIENCY ANEMIA: ICD-10-CM

## 2019-12-24 DIAGNOSIS — Z12.31 ENCOUNTER FOR SCREENING MAMMOGRAM FOR BREAST CANCER: ICD-10-CM

## 2019-12-24 PROCEDURE — 36415 COLL VENOUS BLD VENIPUNCTURE: CPT

## 2019-12-24 PROCEDURE — 77067 SCR MAMMO BI INCL CAD: CPT | Performed by: INTERNAL MEDICINE

## 2019-12-24 PROCEDURE — 77063 BREAST TOMOSYNTHESIS BI: CPT | Performed by: INTERNAL MEDICINE

## 2019-12-24 PROCEDURE — 85025 COMPLETE CBC W/AUTO DIFF WBC: CPT

## 2020-01-08 ENCOUNTER — OFFICE VISIT (OUTPATIENT)
Dept: INTERNAL MEDICINE CLINIC | Facility: CLINIC | Age: 70
End: 2020-01-08
Payer: MEDICARE

## 2020-01-08 VITALS
HEART RATE: 64 BPM | BODY MASS INDEX: 36.14 KG/M2 | SYSTOLIC BLOOD PRESSURE: 138 MMHG | DIASTOLIC BLOOD PRESSURE: 85 MMHG | OXYGEN SATURATION: 96 % | TEMPERATURE: 98 F | WEIGHT: 196.38 LBS | HEIGHT: 62 IN

## 2020-01-08 DIAGNOSIS — J06.9 URTI (ACUTE UPPER RESPIRATORY INFECTION): Primary | ICD-10-CM

## 2020-01-08 PROCEDURE — 99214 OFFICE O/P EST MOD 30 MIN: CPT | Performed by: INTERNAL MEDICINE

## 2020-01-08 RX ORDER — AZITHROMYCIN 250 MG/1
TABLET, FILM COATED ORAL
Qty: 6 TABLET | Refills: 0 | Status: SHIPPED | OUTPATIENT
Start: 2020-01-08 | End: 2020-09-09

## 2020-01-08 RX ORDER — BENZONATATE 100 MG/1
100 CAPSULE ORAL 3 TIMES DAILY PRN
Qty: 30 CAPSULE | Refills: 0 | Status: SHIPPED | OUTPATIENT
Start: 2020-01-08 | End: 2020-09-09

## 2020-01-08 NOTE — PATIENT INSTRUCTIONS
URTI/ acute bronchitis  - will start her on aithromycin , advised he to take with food, drink more fluids , tessalon pearls for the cough , if not better call me  htn - controled - advised her to continue with current meds, check bp at home and bring log b

## 2020-01-08 NOTE — PROGRESS NOTES
HPI:    Patient ID: Raad Meadows is a 68year old female. HPI She is here today complaining of cough and congestion   Her symptoms started one weeks ago Sunday with uri symptoms, cough and congestion .  Per her symptoms got better ,but her chest conge Outpatient Medications   Medication Sig Dispense Refill   • LISINOPRIL 20 MG Oral Tab TAKE 1 TABLET EVERY DAY 90 tablet 1   • Ferrous Sulfate 325 (65 Fe) MG Oral Tab Take 1 tablet (325 mg total) by mouth daily with breakfast. 90 tablet 0   • SIMVASTATIN 20 Ashkenazi Christian Descent Neg       Social History: Social History    Tobacco Use      Smoking status: Never Smoker      Smokeless tobacco: Never Used    Alcohol use: No      Alcohol/week: 0.0 standard drinks      Comment: Rarely    Drug use: No       PHYSI hepatosplenomegaly. There is no tenderness. There is no rigidity, no rebound, no guarding and no CVA tenderness. Musculoskeletal: Normal range of motion. General: No edema. Lymphadenopathy:     She has no cervical adenopathy.      She has no axi

## 2020-01-29 RX ORDER — LIDOCAINE HYDROCHLORIDE 20 MG/ML
SOLUTION ORAL; TOPICAL
Qty: 90 TABLET | Refills: 1 | Status: SHIPPED | OUTPATIENT
Start: 2020-01-29 | End: 2020-06-04

## 2020-01-29 NOTE — TELEPHONE ENCOUNTER
Review pended refill request as it does not fall under a protocol.   Requested Prescriptions     Pending Prescriptions Disp Refills   • FEROSUL 325 (65 Fe) MG Oral Tab [Pharmacy Med Name: FEROSUL 325 (65 Fe) MG Tablet] 90 tablet 0     Sig: TAKE 1 TABLET (32

## 2020-02-06 RX ORDER — SIMVASTATIN 20 MG
TABLET ORAL
Qty: 90 TABLET | Refills: 1 | Status: SHIPPED | OUTPATIENT
Start: 2020-02-06 | End: 2020-07-23

## 2020-02-06 NOTE — TELEPHONE ENCOUNTER
Refill passed per [facility], [facility] protocol.   Cholesterol Medications  Protocol Criteria:  · Appointment scheduled in the past 12 months or in the next 3 months  · ALT & LDL on file in the past 12 months  · ALT result < 80  · LDL result <130   Recent Outpat

## 2020-06-04 RX ORDER — LIDOCAINE HYDROCHLORIDE 20 MG/ML
SOLUTION ORAL; TOPICAL
Qty: 90 TABLET | Refills: 1 | Status: SHIPPED | OUTPATIENT
Start: 2020-06-04

## 2020-07-23 RX ORDER — SIMVASTATIN 20 MG
TABLET ORAL
Qty: 90 TABLET | Refills: 1 | Status: SHIPPED | OUTPATIENT
Start: 2020-07-23 | End: 2021-02-01

## 2020-09-09 ENCOUNTER — OFFICE VISIT (OUTPATIENT)
Dept: INTERNAL MEDICINE CLINIC | Facility: CLINIC | Age: 70
End: 2020-09-09
Payer: MEDICARE

## 2020-09-09 VITALS
OXYGEN SATURATION: 97 % | HEART RATE: 78 BPM | DIASTOLIC BLOOD PRESSURE: 82 MMHG | WEIGHT: 212 LBS | BODY MASS INDEX: 37.56 KG/M2 | HEIGHT: 63 IN | TEMPERATURE: 98 F | SYSTOLIC BLOOD PRESSURE: 130 MMHG

## 2020-09-09 DIAGNOSIS — Z00.00 MEDICARE ANNUAL WELLNESS VISIT, SUBSEQUENT: ICD-10-CM

## 2020-09-09 DIAGNOSIS — D12.2 ADENOMATOUS POLYP OF ASCENDING COLON: Primary | ICD-10-CM

## 2020-09-09 DIAGNOSIS — Z12.31 ENCOUNTER FOR SCREENING MAMMOGRAM FOR BREAST CANCER: ICD-10-CM

## 2020-09-09 DIAGNOSIS — Z00.00 ENCOUNTER FOR ANNUAL HEALTH EXAMINATION: ICD-10-CM

## 2020-09-09 LAB
ALBUMIN SERPL-MCNC: 4 G/DL (ref 3.4–5)
ALBUMIN/GLOB SERPL: 1.3 {RATIO} (ref 1–2)
ALP LIVER SERPL-CCNC: 87 U/L (ref 55–142)
ALT SERPL-CCNC: 24 U/L (ref 13–56)
ANION GAP SERPL CALC-SCNC: 5 MMOL/L (ref 0–18)
AST SERPL-CCNC: 23 U/L (ref 15–37)
BASOPHILS # BLD AUTO: 0.03 X10(3) UL (ref 0–0.2)
BASOPHILS NFR BLD AUTO: 0.5 %
BILIRUB SERPL-MCNC: 0.5 MG/DL (ref 0.1–2)
BUN BLD-MCNC: 13 MG/DL (ref 7–18)
BUN/CREAT SERPL: 14.8 (ref 10–20)
CALCIUM BLD-MCNC: 9.1 MG/DL (ref 8.5–10.1)
CHLORIDE SERPL-SCNC: 108 MMOL/L (ref 98–112)
CHOLEST SMN-MCNC: 168 MG/DL (ref ?–200)
CO2 SERPL-SCNC: 27 MMOL/L (ref 21–32)
CREAT BLD-MCNC: 0.88 MG/DL (ref 0.55–1.02)
DEPRECATED RDW RBC AUTO: 43.8 FL (ref 35.1–46.3)
EOSINOPHIL # BLD AUTO: 0.08 X10(3) UL (ref 0–0.7)
EOSINOPHIL NFR BLD AUTO: 1.2 %
ERYTHROCYTE [DISTWIDTH] IN BLOOD BY AUTOMATED COUNT: 12.8 % (ref 11–15)
GLOBULIN PLAS-MCNC: 3.1 G/DL (ref 2.8–4.4)
GLUCOSE BLD-MCNC: 87 MG/DL (ref 70–99)
HCT VFR BLD AUTO: 44.3 % (ref 35–48)
HDLC SERPL-MCNC: 46 MG/DL (ref 40–59)
HGB BLD-MCNC: 14.2 G/DL (ref 12–16)
IMM GRANULOCYTES # BLD AUTO: 0.01 X10(3) UL (ref 0–1)
IMM GRANULOCYTES NFR BLD: 0.2 %
LDLC SERPL CALC-MCNC: 90 MG/DL (ref ?–100)
LYMPHOCYTES # BLD AUTO: 1.38 X10(3) UL (ref 1–4)
LYMPHOCYTES NFR BLD AUTO: 21.3 %
M PROTEIN MFR SERPL ELPH: 7.1 G/DL (ref 6.4–8.2)
MCH RBC QN AUTO: 29.8 PG (ref 26–34)
MCHC RBC AUTO-ENTMCNC: 32.1 G/DL (ref 31–37)
MCV RBC AUTO: 93.1 FL (ref 80–100)
MONOCYTES # BLD AUTO: 0.42 X10(3) UL (ref 0.1–1)
MONOCYTES NFR BLD AUTO: 6.5 %
NEUTROPHILS # BLD AUTO: 4.57 X10 (3) UL (ref 1.5–7.7)
NEUTROPHILS # BLD AUTO: 4.57 X10(3) UL (ref 1.5–7.7)
NEUTROPHILS NFR BLD AUTO: 70.3 %
NONHDLC SERPL-MCNC: 122 MG/DL (ref ?–130)
OSMOLALITY SERPL CALC.SUM OF ELEC: 289 MOSM/KG (ref 275–295)
PATIENT FASTING Y/N/NP: YES
PATIENT FASTING Y/N/NP: YES
PLATELET # BLD AUTO: 224 10(3)UL (ref 150–450)
POTASSIUM SERPL-SCNC: 4.2 MMOL/L (ref 3.5–5.1)
RBC # BLD AUTO: 4.76 X10(6)UL (ref 3.8–5.3)
SODIUM SERPL-SCNC: 140 MMOL/L (ref 136–145)
TRIGL SERPL-MCNC: 162 MG/DL (ref 30–149)
TSI SER-ACNC: 2.57 MIU/ML (ref 0.36–3.74)
VLDLC SERPL CALC-MCNC: 32 MG/DL (ref 0–30)
WBC # BLD AUTO: 6.5 X10(3) UL (ref 4–11)

## 2020-09-09 PROCEDURE — G0008 ADMIN INFLUENZA VIRUS VAC: HCPCS | Performed by: INTERNAL MEDICINE

## 2020-09-09 PROCEDURE — 90662 IIV NO PRSV INCREASED AG IM: CPT | Performed by: INTERNAL MEDICINE

## 2020-09-09 PROCEDURE — G0439 PPPS, SUBSEQ VISIT: HCPCS | Performed by: INTERNAL MEDICINE

## 2020-09-09 PROCEDURE — 36415 COLL VENOUS BLD VENIPUNCTURE: CPT | Performed by: INTERNAL MEDICINE

## 2020-09-09 RX ORDER — LISINOPRIL 20 MG/1
20 TABLET ORAL DAILY
Qty: 90 TABLET | Refills: 1 | Status: SHIPPED | OUTPATIENT
Start: 2020-09-09 | End: 2021-02-01

## 2020-09-09 RX ORDER — DOXYCYCLINE HYCLATE 50 MG/1
50 CAPSULE ORAL DAILY
COMMUNITY

## 2020-09-09 NOTE — PATIENT INSTRUCTIONS
[address] SCREENING SCHEDULE   Tests on this list are recommended by your physician but may not be covered, or covered at this frequency, by your insurer. Please check with your insurance carrier before scheduling to verify coverage.    PREVENTATI if abnormal Colonoscopy due on 10/13/2020 Update [facility] if applicable    Flex Sigmoidoscopy Screen  Covered every 5 years No results found for this or any previous visit. No flowsheet data found.      Fecal Occult Blood   Covered Annually No res performed in visit on 11/04/14   • FLU VACC PRSV FREE INC ANTIG    Please get every year    Pneumococcal 13 (Prevnar)  Covered Once after 65 Orders placed or performed in visit on 02/24/15   • PNEUMOCOCCAL VACC, 13 JORGE IM    Please get once after your 65th

## 2020-09-09 NOTE — PROGRESS NOTES
HPI:   Linda Farooq is a 65year old female who presents for a Medicare Subsequent Annual Wellness visit (Pt already had Initial Annual Wellness).       Her last annual assessment has been over 1 year: Annual Physical due on 08/26/2020         Fall/Risk Last Cholesterol Labs:   Lab Results   Component Value Date    CHOLEST 164 04/12/2019    HDL 59 04/12/2019    LDL 83 04/12/2019    TRIG 110 04/12/2019          Last Chemistry Labs:   Lab Results   Component Value Date    AST 14 (L) 06/21/2019    ALT 20 0 or use drugs.      REVIEW OF SYSTEMS:   Review of Systems   GENERAL: feels well otherwise  SKIN: denies any unusual skin lesions  EYES: denies blurred vision or double vision  HEENT: denies nasal congestion, sinus pain or ST  LUNGS: denies shortness of nicole seem to mumble (or don't speak clearly):  Yes People get annoyed because I misunderstand what they say:  No   I misunderstand what others are saying and make inappropriate responses:  Sometimes I avoid social activities because I cannot hear well and fear guarding. No hernia. Neurological: She is alert and oriented to person, place, and time. She displays normal reflexes. No cranial nerve deficit, sensory deficit or motor deficit. Coordination and gait normal.   Skin: Skin is warm and dry.    Psychiatric: daily.  -     FLU VACC HIGH DOSE PRSV FREE         Diet assessment: good     PLAN:  The patient indicates understanding of these issues and agrees to the plan. Reinforced healthy diet, lifestyle, and exercise. No follow-ups on file.      Nate Castellanos age 33-67, age 72 and older at high risk There are no preventive care reminders to display for this patient. Update Health Maintenance if applicable    Chlamydia  Annually if high risk No results found for: CHLAMYDIA No flowsheet data found.     Screening M data found.                Template: [employer] ANNUAL ASSESSMENT FEMALE Shruthi Ahr [[id number]]

## 2020-10-12 ENCOUNTER — OFFICE VISIT (OUTPATIENT)
Dept: DERMATOLOGY CLINIC | Facility: CLINIC | Age: 70
End: 2020-10-12
Payer: MEDICARE

## 2020-10-12 DIAGNOSIS — D23.30 BENIGN NEOPLASM OF SKIN OF FACE: ICD-10-CM

## 2020-10-12 DIAGNOSIS — Z85.828 HISTORY OF NONMELANOMA SKIN CANCER: ICD-10-CM

## 2020-10-12 DIAGNOSIS — D48.5 NEOPLASM OF UNCERTAIN BEHAVIOR OF SKIN: Primary | ICD-10-CM

## 2020-10-12 DIAGNOSIS — D23.4 BENIGN NEOPLASM OF SCALP AND SKIN OF NECK: ICD-10-CM

## 2020-10-12 DIAGNOSIS — D23.70 BENIGN NEOPLASM OF SKIN OF LOWER LIMB, INCLUDING HIP, UNSPECIFIED LATERALITY: ICD-10-CM

## 2020-10-12 DIAGNOSIS — D23.60 BENIGN NEOPLASM OF SKIN OF UPPER LIMB, INCLUDING SHOULDER, UNSPECIFIED LATERALITY: ICD-10-CM

## 2020-10-12 DIAGNOSIS — L82.1 SEBORRHEIC KERATOSES: ICD-10-CM

## 2020-10-12 DIAGNOSIS — D23.5 BENIGN NEOPLASM OF SKIN OF TRUNK, EXCEPT SCROTUM: ICD-10-CM

## 2020-10-12 DIAGNOSIS — D22.9 MULTIPLE NEVI: ICD-10-CM

## 2020-10-12 PROCEDURE — 88305 TISSUE EXAM BY PATHOLOGIST: CPT | Performed by: DERMATOLOGY

## 2020-10-12 PROCEDURE — 99202 OFFICE O/P NEW SF 15 MIN: CPT | Performed by: DERMATOLOGY

## 2020-10-12 PROCEDURE — G0463 HOSPITAL OUTPT CLINIC VISIT: HCPCS | Performed by: DERMATOLOGY

## 2020-10-12 RX ORDER — TOBRAMYCIN 3 MG/ML
SOLUTION/ DROPS OPHTHALMIC
COMMUNITY
Start: 2020-07-20

## 2020-10-19 NOTE — PROGRESS NOTES
Levar Doan is a 65year old female. HPI:     CC:  Patient presents with:  Full Skin Exam: LOV 11/4/11. pt presenting today with full body skincheck.  pt has HX of BCC        Allergies:  Naproxen    HISTORY:    Past Medical History:   Diagnosis Date Solution INSTILL ONE DROP IN AFFECTED EYE FOUR TIMES DAILY     • Doxycycline Hyclate 50 MG Oral Cap Take 50 mg by mouth daily. • lisinopril 20 MG Oral Tab Take 1 tablet (20 mg total) by mouth daily.  90 tablet 1   • SIMVASTATIN 20 MG Oral Tab TAKE 1 TAB Comment: Rarely      Drug use: No      Sexual activity: Not on file    Lifestyle      Physical activity        Days per week: Not on file        Minutes per session: Not on file      Stress: Not on file    Relationships      Social connections        Needles Problems Maternal Cousin Female    • No Known Problems Maternal Cousin Male    • No Known Problems Paternal Cousin Female    • No Known Problems Paternal Cousin Male    • Breast Cancer Neg    • DCIS Neg    • LCIS Neg    • BRCA gene + Neg    • Ashkenazi Taoist This Encounter      Specimen to Pathology, Tissue [IHP Pt to [address] for this Visit:  Requested Prescriptions      No prescriptions requested or ordered in this encounter         Neoplasm of uncertain behavior of skin  (primary enco regarding any confusion resulting from errors in recognition.

## 2020-10-20 NOTE — PROGRESS NOTES
The pathology report from last visit showed right temple I DN. Please log in test results, send biopsy results letter. Pt to rtc 1 year or prn.

## 2020-10-25 ENCOUNTER — APPOINTMENT (OUTPATIENT)
Dept: LAB | Facility: HOSPITAL | Age: 70
End: 2020-10-25
Attending: INTERNAL MEDICINE
Payer: MEDICARE

## 2020-10-25 DIAGNOSIS — Z01.818 PRE-OP TESTING: ICD-10-CM

## 2020-10-28 ENCOUNTER — HOSPITAL ENCOUNTER (OUTPATIENT)
Facility: HOSPITAL | Age: 70
Setting detail: HOSPITAL OUTPATIENT SURGERY
Discharge: HOME | End: 2020-10-28
Attending: INTERNAL MEDICINE | Admitting: INTERNAL MEDICINE
Payer: MEDICARE

## 2020-10-28 VITALS
DIASTOLIC BLOOD PRESSURE: 83 MMHG | HEART RATE: 74 BPM | HEIGHT: 62 IN | RESPIRATION RATE: 16 BRPM | BODY MASS INDEX: 36.8 KG/M2 | SYSTOLIC BLOOD PRESSURE: 126 MMHG | WEIGHT: 200 LBS | OXYGEN SATURATION: 96 % | TEMPERATURE: 98 F

## 2020-10-28 DIAGNOSIS — Z01.818 PRE-OP TESTING: Primary | ICD-10-CM

## 2020-10-28 DIAGNOSIS — Z86.010 PERSONAL HISTORY OF COLONIC POLYPS: ICD-10-CM

## 2020-10-28 PROCEDURE — 99153 MOD SED SAME PHYS/QHP EA: CPT | Performed by: INTERNAL MEDICINE

## 2020-10-28 PROCEDURE — 0DJD8ZZ INSPECTION OF LOWER INTESTINAL TRACT, VIA NATURAL OR ARTIFICIAL OPENING ENDOSCOPIC: ICD-10-PCS | Performed by: INTERNAL MEDICINE

## 2020-10-28 PROCEDURE — 99152 MOD SED SAME PHYS/QHP 5/>YRS: CPT | Performed by: INTERNAL MEDICINE

## 2020-10-28 RX ORDER — MIDAZOLAM HYDROCHLORIDE 1 MG/ML
INJECTION INTRAMUSCULAR; INTRAVENOUS
Status: DISCONTINUED | OUTPATIENT
Start: 2020-10-28 | End: 2020-10-28

## 2020-10-28 RX ORDER — SODIUM CHLORIDE 0.9 % (FLUSH) 0.9 %
10 SYRINGE (ML) INJECTION AS NEEDED
Status: DISCONTINUED | OUTPATIENT
Start: 2020-10-28 | End: 2020-10-28

## 2020-10-28 RX ORDER — SODIUM CHLORIDE, SODIUM LACTATE, POTASSIUM CHLORIDE, CALCIUM CHLORIDE 600; 310; 30; 20 MG/100ML; MG/100ML; MG/100ML; MG/100ML
INJECTION, SOLUTION INTRAVENOUS CONTINUOUS
Status: DISCONTINUED | OUTPATIENT
Start: 2020-10-28 | End: 2020-10-28

## 2020-10-28 RX ORDER — MIDAZOLAM HYDROCHLORIDE 1 MG/ML
1 INJECTION INTRAMUSCULAR; INTRAVENOUS EVERY 5 MIN PRN
Status: DISCONTINUED | OUTPATIENT
Start: 2020-10-28 | End: 2020-10-28

## 2020-10-28 NOTE — H&P
RE-PROCEDURE UPDATE    HPI: Jannet Grimes is a 65year old female. 8/10/1950. Patient presents for a colonoscopy. ALLERGIES:   Naproxen                HIVES    Comment:Other reaction(s): NAPROXEN    No current outpatient medications on file.      Pa

## 2020-10-28 NOTE — OPERATIVE REPORT
COLONOSCOPY REPORT    Patient Name:  Alice Packer Record #: [id number]  YOB: 1950  Date of Procedure: 10/28/2020    Referring physician: Biju Valladares MD    Surgeon:  Kelby Leung.  Yinka Stallworth MD    Pre-op diagnosis: Colon cancer screen

## 2021-01-02 ENCOUNTER — HOSPITAL ENCOUNTER (OUTPATIENT)
Dept: MAMMOGRAPHY | Facility: HOSPITAL | Age: 71
Discharge: HOME | End: 2021-01-02
Attending: INTERNAL MEDICINE
Payer: MEDICARE

## 2021-01-02 DIAGNOSIS — Z12.31 ENCOUNTER FOR SCREENING MAMMOGRAM FOR BREAST CANCER: ICD-10-CM

## 2021-01-02 PROCEDURE — 77067 SCR MAMMO BI INCL CAD: CPT | Performed by: INTERNAL MEDICINE

## 2021-01-02 PROCEDURE — 77063 BREAST TOMOSYNTHESIS BI: CPT | Performed by: INTERNAL MEDICINE

## 2021-02-01 RX ORDER — SIMVASTATIN 20 MG
20 TABLET ORAL NIGHTLY
Qty: 90 TABLET | Refills: 1 | Status: SHIPPED | OUTPATIENT
Start: 2021-02-01 | End: 2021-08-14

## 2021-02-01 RX ORDER — LISINOPRIL 20 MG/1
20 TABLET ORAL DAILY
Qty: 90 TABLET | Refills: 1 | Status: SHIPPED | OUTPATIENT
Start: 2021-02-01 | End: 2021-08-26

## 2021-02-01 NOTE — TELEPHONE ENCOUNTER
Pt called in for refill on Rx lisinopril and simvastatin. Please advise         Current Outpatient Medications   Medication Sig Dispense Refill                 • lisinopril 20 MG Oral Tab Take 1 tablet (20 mg total) by mouth daily.  90 tablet 1   • SIMVASTA

## 2021-03-04 DIAGNOSIS — Z23 NEED FOR VACCINATION: ICD-10-CM

## 2021-03-10 ENCOUNTER — IMMUNIZATION (OUTPATIENT)
Dept: LAB | Facility: HOSPITAL | Age: 71
End: 2021-03-10
Attending: HOSPITALIST
Payer: MEDICARE

## 2021-03-10 DIAGNOSIS — Z23 NEED FOR VACCINATION: Primary | ICD-10-CM

## 2021-03-10 PROCEDURE — 0011A SARSCOV2 VAC 100MCG/0.5ML IM: CPT

## 2021-04-07 ENCOUNTER — IMMUNIZATION (OUTPATIENT)
Dept: LAB | Facility: HOSPITAL | Age: 71
End: 2021-04-07
Attending: EMERGENCY MEDICINE
Payer: MEDICARE

## 2021-04-07 DIAGNOSIS — Z23 NEED FOR VACCINATION: Primary | ICD-10-CM

## 2021-04-07 PROCEDURE — 0012A SARSCOV2 VAC 100MCG/0.5ML IM: CPT

## 2021-05-24 ENCOUNTER — TELEPHONE (OUTPATIENT)
Dept: CASE MANAGEMENT | Age: 71
End: 2021-05-24

## 2021-05-24 DIAGNOSIS — H91.93 BILATERAL HEARING LOSS, UNSPECIFIED HEARING LOSS TYPE: Primary | ICD-10-CM

## 2021-05-24 NOTE — TELEPHONE ENCOUNTER
Pt would like a referral to have audiology test. Pt notes that her quality of hearing has decreased in both ears. Please sign referral if you agree with plan of care.

## 2021-06-04 ENCOUNTER — TELEPHONE (OUTPATIENT)
Dept: INTERNAL MEDICINE CLINIC | Facility: CLINIC | Age: 71
End: 2021-06-04

## 2021-06-04 ENCOUNTER — OFFICE VISIT (OUTPATIENT)
Dept: INTERNAL MEDICINE CLINIC | Facility: CLINIC | Age: 71
End: 2021-06-04
Payer: MEDICARE

## 2021-06-04 VITALS
DIASTOLIC BLOOD PRESSURE: 80 MMHG | RESPIRATION RATE: 18 BRPM | HEART RATE: 75 BPM | SYSTOLIC BLOOD PRESSURE: 126 MMHG | OXYGEN SATURATION: 99 % | TEMPERATURE: 100 F | HEIGHT: 62 IN | WEIGHT: 220 LBS | BODY MASS INDEX: 40.48 KG/M2

## 2021-06-04 DIAGNOSIS — R55 SYNCOPE, UNSPECIFIED SYNCOPE TYPE: Primary | ICD-10-CM

## 2021-06-04 PROCEDURE — 99213 OFFICE O/P EST LOW 20 MIN: CPT | Performed by: INTERNAL MEDICINE

## 2021-06-04 PROCEDURE — 93000 ELECTROCARDIOGRAM COMPLETE: CPT | Performed by: INTERNAL MEDICINE

## 2021-06-04 NOTE — PATIENT INSTRUCTIONS
Syncope - etiology ?  possible vasovagal but we can not rule out other etiology as well- I will order us carotid doppler , 2 d echo, holter monitor , I did advised her if this happens again she needs to go to er

## 2021-06-04 NOTE — PROGRESS NOTES
HPI/Subjective:     Patient ID: Alyssa Suarez is a 65year old female. She came in today for follow-up after syncopal episode on May 26.   According to her she was doing okay during the day she had  abdominal cramping like she is having diarrhea while Negative for difficulty urinating, dysuria, enuresis, flank pain, frequency, hematuria and urgency. Musculoskeletal: Negative for arthralgias, back pain, gait problem, joint swelling, myalgias, neck pain and neck stiffness.    Allergic/Immunologic: Rosaleen Apgar carcinoma with skin graft    • OTHER Left 05/2005    wrist fx surgery   • OTHER SURGICAL HISTORY  12/12/2017    left knee torn meniscus   • TONSILLECTOMY        Family History   Problem Relation Age of Onset   • Heart Disease Father 61        Cause of deat oropharyngeal erythema. Eyes:      General: No scleral icterus. Right eye: No discharge. Left eye: No discharge. Conjunctiva/sclera: Conjunctivae normal.      Pupils: Pupils are equal, round, and reactive to light.    Neck:      Thyroi her if this happens again she needs to go to er     Laceration of the head- healing well   Syncope, unspecified syncope type  (primary encounter diagnosis)    No orders of the defined types were placed in this encounter.       Meds This Visit:  Requested Pr

## 2021-06-04 NOTE — TELEPHONE ENCOUNTER
Reported that she has episode of fainting but this time it is worse and  hurt herself.   Reported that had vasovagal symptoms on 5/26/21 evening-stomach cramping, fainted for less than a minute when getting off the toilet and hit her head to the cabinet candido

## 2021-06-10 ENCOUNTER — HOSPITAL ENCOUNTER (OUTPATIENT)
Dept: CV DIAGNOSTICS | Facility: HOSPITAL | Age: 71
Discharge: HOME | End: 2021-06-10
Attending: INTERNAL MEDICINE
Payer: MEDICARE

## 2021-06-10 DIAGNOSIS — R55 SYNCOPE, UNSPECIFIED SYNCOPE TYPE: ICD-10-CM

## 2021-06-10 PROCEDURE — 93227 XTRNL ECG REC<48 HR R&I: CPT | Performed by: INTERNAL MEDICINE

## 2021-06-10 PROCEDURE — 93225 XTRNL ECG REC<48 HRS REC: CPT | Performed by: INTERNAL MEDICINE

## 2021-06-25 ENCOUNTER — HOSPITAL ENCOUNTER (OUTPATIENT)
Dept: CV DIAGNOSTICS | Facility: HOSPITAL | Age: 71
Discharge: HOME | End: 2021-06-25
Attending: INTERNAL MEDICINE
Payer: MEDICARE

## 2021-06-25 ENCOUNTER — HOSPITAL ENCOUNTER (OUTPATIENT)
Dept: ULTRASOUND IMAGING | Facility: HOSPITAL | Age: 71
Discharge: HOME | End: 2021-06-25
Attending: INTERNAL MEDICINE
Payer: MEDICARE

## 2021-06-25 DIAGNOSIS — R55 SYNCOPE, UNSPECIFIED SYNCOPE TYPE: ICD-10-CM

## 2021-06-25 PROCEDURE — 93880 EXTRACRANIAL BILAT STUDY: CPT | Performed by: INTERNAL MEDICINE

## 2021-06-25 PROCEDURE — 93306 TTE W/DOPPLER COMPLETE: CPT | Performed by: INTERNAL MEDICINE

## 2021-07-01 ENCOUNTER — MED REC SCAN ONLY (OUTPATIENT)
Dept: INTERNAL MEDICINE CLINIC | Facility: CLINIC | Age: 71
End: 2021-07-01

## 2021-08-14 RX ORDER — SIMVASTATIN 20 MG
20 TABLET ORAL NIGHTLY
Qty: 90 TABLET | Refills: 1 | Status: SHIPPED | OUTPATIENT
Start: 2021-08-14 | End: 2022-02-22

## 2021-08-14 NOTE — TELEPHONE ENCOUNTER
Refill passed per [facility], [facility] protocol.     Requested Prescriptions   Pending Prescriptions Disp Refills    SIMVASTATIN 20 MG Oral Tab [Pharmacy Med Name: SIMVASTATIN 20 MG TABLET] 90 tablet 1     Sig: TAKE 1 TABLET BY MOUTH EVERY DAY AT NIGHT        Cholesterol Medication Protocol Passed - 8/14/2021 12:05 AM        Passed - ALT in past 12 months        Passed - LDL in past 12 months        Passed - Last ALT < 80       Lab Results   Component Value Date    ALT 24 09/09/2020             Passed - Last LDL < 130     Lab Results   Component Value Date    LDL 90 09/09/2020             Passed - Appointment in past 12 or next 3 months                  Recent Outpatient Visits              2 months ago Syncope, unspecified syncope type    Lucía Ruiz MD    Office Visit    10 months ago Personal history of colonic polyps    Gastoenterology - Devin Diaz, Rebecca    Nurse Only    10 months ago Neoplasm of uncertain behavior of skin    [facility] Felipe Corbett MD    Office Visit    11 months ago Adenomatous polyp of ascending colon    Vangie Jang MD    Office Visit    1 year ago URTI (acute upper respiratory infection)    [facility], Ninoska Velez MD    Office Visit

## 2021-08-26 RX ORDER — LISINOPRIL 20 MG/1
20 TABLET ORAL DAILY
Qty: 90 TABLET | Refills: 1 | Status: SHIPPED | OUTPATIENT
Start: 2021-08-26

## 2021-08-26 NOTE — TELEPHONE ENCOUNTER
Refill passed per [facility], [facility] protocol.   Requested Prescriptions   Pending Prescriptions Disp Refills    LISINOPRIL 20 MG Oral Tab [Pharmacy Med Name: LISINOPRIL 20 MG TABLET] 90 tablet 1     Sig: TAKE 1 TABLET BY MOUTH EVERY DAY        Hypertensive Me

## 2021-09-09 ENCOUNTER — OFFICE VISIT (OUTPATIENT)
Dept: AUDIOLOGY | Facility: CLINIC | Age: 71
End: 2021-09-09
Payer: MEDICARE

## 2021-09-09 ENCOUNTER — OFFICE VISIT (OUTPATIENT)
Dept: OTOLARYNGOLOGY | Facility: CLINIC | Age: 71
End: 2021-09-09
Payer: MEDICARE

## 2021-09-09 VITALS — WEIGHT: 220 LBS | BODY MASS INDEX: 40.48 KG/M2 | HEIGHT: 62 IN | TEMPERATURE: 97 F

## 2021-09-09 DIAGNOSIS — H91.93 BILATERAL HEARING LOSS, UNSPECIFIED HEARING LOSS TYPE: Primary | ICD-10-CM

## 2021-09-09 DIAGNOSIS — H90.3 SENSORINEURAL HEARING LOSS, BILATERAL: Primary | ICD-10-CM

## 2021-09-09 PROCEDURE — 92567 TYMPANOMETRY: CPT | Performed by: AUDIOLOGIST

## 2021-09-09 PROCEDURE — 92557 COMPREHENSIVE HEARING TEST: CPT | Performed by: AUDIOLOGIST

## 2021-09-09 PROCEDURE — 99203 OFFICE O/P NEW LOW 30 MIN: CPT | Performed by: OTOLARYNGOLOGY

## 2021-09-09 RX ORDER — MULTIVIT-MIN/IRON/FOLIC ACID/K 18-600-40
CAPSULE ORAL
COMMUNITY

## 2021-09-09 RX ORDER — MULTIVIT WITH MINERALS/LUTEIN
1000 TABLET ORAL DAILY
COMMUNITY

## 2021-09-09 NOTE — PROGRESS NOTES
Jannet Grimes is a 66year old female. Patient presents with:  Hearing Loss      HISTORY OF PRESENT ILLNESS  Long history of progressive hearing loss. She did have a previous hearing test performed back in 2016 when she met with Dr. Jose David Rousseau.   Slight progre 2002   • High blood pressure    • High cholesterol    • Osteoarthritis    • Other and unspecified hyperlipidemia    • Unspecified essential hypertension    • Visual impairment     glasses       Past Surgical History:   Procedure Laterality Date   • COLONOS Skull - Normal.        Nasopharynx Normal External nose - Normal. Lips/teeth/gums - Normal. Tonsils - Normal. Oropharynx - Normal.   Ears Normal Inspection - Right: Normal, Left: Normal. Canal - Right: Normal, Left: Normal. TM - Right: Normal, Left: Normal AUDIOLOGY        This note was prepared using "[employer] [facility] voice recognition dictation software. As a result errors may occur. When identified these errors have been corrected.  While every attempt is made to correct errors during dictation discrepancies

## 2021-09-09 NOTE — PATIENT INSTRUCTIONS
How Hearing Aids Can Help You    Losing your hearing can be frustrating. But hearing aids can help you hear what [place] been missing. Not everyone who has hearing loss needs hearing aids.  Hearing aids will most likely help you if your hearing loss:   · [employer], located within [facility]37. All rights reserved. This information is not intended as a substitute for professional medical care. Always follow your healthcare professional's instructions.

## 2021-10-18 ENCOUNTER — OFFICE VISIT (OUTPATIENT)
Dept: INTERNAL MEDICINE CLINIC | Facility: CLINIC | Age: 71
End: 2021-10-18
Payer: MEDICARE

## 2021-10-18 VITALS
HEART RATE: 69 BPM | BODY MASS INDEX: 40.12 KG/M2 | OXYGEN SATURATION: 97 % | SYSTOLIC BLOOD PRESSURE: 135 MMHG | WEIGHT: 218 LBS | HEIGHT: 62 IN | DIASTOLIC BLOOD PRESSURE: 80 MMHG

## 2021-10-18 DIAGNOSIS — Z00.00 ANNUAL PHYSICAL EXAM: ICD-10-CM

## 2021-10-18 DIAGNOSIS — Z00.00 ENCOUNTER FOR ANNUAL HEALTH EXAMINATION: ICD-10-CM

## 2021-10-18 DIAGNOSIS — Z12.31 ENCOUNTER FOR SCREENING MAMMOGRAM FOR MALIGNANT NEOPLASM OF BREAST: Primary | ICD-10-CM

## 2021-10-18 PROCEDURE — G0008 ADMIN INFLUENZA VIRUS VAC: HCPCS | Performed by: INTERNAL MEDICINE

## 2021-10-18 PROCEDURE — 36415 COLL VENOUS BLD VENIPUNCTURE: CPT | Performed by: INTERNAL MEDICINE

## 2021-10-18 PROCEDURE — G0439 PPPS, SUBSEQ VISIT: HCPCS | Performed by: INTERNAL MEDICINE

## 2021-10-18 PROCEDURE — 90662 IIV NO PRSV INCREASED AG IM: CPT | Performed by: INTERNAL MEDICINE

## 2021-10-18 NOTE — PATIENT INSTRUCTIONS
[address] [employer] SCREENING SCHEDULE   Tests on this list are recommended by your physician but may not be covered, or covered at this frequency, by your insurer. Please check with your insurance carrier before scheduling to verify coverage.    Vic Anaya 11/21/2017      No recommendations at this time   Pap and Pelvic    Pap   Covered every 2 years for women at normal risk;  Annually if at high risk -  No recommendations at this time    Chlamydia Annually if high risk -  No recommendations at this time   Sc link to the [employer]. This site has a lot of good information including definitions of the different types of Advance Directives.  It also has the State forms available on it's website for anyone to review and print using their home co

## 2022-01-05 ENCOUNTER — HOSPITAL ENCOUNTER (OUTPATIENT)
Dept: MAMMOGRAPHY | Facility: HOSPITAL | Age: 72
Discharge: HOME | End: 2022-01-05
Attending: INTERNAL MEDICINE
Payer: MEDICARE

## 2022-01-05 DIAGNOSIS — Z12.31 ENCOUNTER FOR SCREENING MAMMOGRAM FOR MALIGNANT NEOPLASM OF BREAST: ICD-10-CM

## 2022-01-05 DIAGNOSIS — Z00.00 ANNUAL PHYSICAL EXAM: ICD-10-CM

## 2022-01-05 PROCEDURE — 77063 BREAST TOMOSYNTHESIS BI: CPT | Performed by: INTERNAL MEDICINE

## 2022-01-05 PROCEDURE — 77067 SCR MAMMO BI INCL CAD: CPT | Performed by: INTERNAL MEDICINE

## 2022-02-22 RX ORDER — LISINOPRIL 20 MG/1
TABLET ORAL
Qty: 90 TABLET | Refills: 1 | Status: SHIPPED | OUTPATIENT
Start: 2022-02-22 | End: 2022-08-19

## 2022-02-22 RX ORDER — SIMVASTATIN 20 MG
TABLET ORAL
Qty: 90 TABLET | Refills: 1 | Status: SHIPPED | OUTPATIENT
Start: 2022-02-22 | End: 2022-08-19

## 2022-02-22 NOTE — TELEPHONE ENCOUNTER
Refill passed per [facility] protocol.     Requested Prescriptions   Pending Prescriptions Disp Refills    SIMVASTATIN 20 MG Oral Tab [Pharmacy Med Name: SIMVASTATIN 20 MG TABLET] 90 tablet 1     Sig: TAKE 1 TABLET BY MOUTH EVERY DAY AT NIGHT        Cholesterol Medication Protocol Passed - 2/21/2022  9:08 AM        Passed - ALT in past 12 months        Passed - LDL in past 12 months        Passed - Last ALT < 80       Lab Results   Component Value Date    ALT 29 10/18/2021             Passed - Last LDL < 130     Lab Results   Component Value Date    LDL 86 10/18/2021               Passed - Appointment in past 12 or next 3 months           LISINOPRIL 20 MG Oral Tab [Pharmacy Med Name: LISINOPRIL 20 MG TABLET] 90 tablet 1     Sig: TAKE 1 TABLET BY MOUTH EVERY DAY        Hypertensive Medications Protocol Passed - 2/21/2022  9:08 AM        Passed - CMP or BMP in past 12 months        Passed - Appointment in past 6 or next 3 months        Passed - GFR Non- > 50     Lab Results   Component Value Date    GFRNAA 68 10/18/2021                       Recent Outpatient Visits              4 months ago Encounter for screening mammogram for malignant neoplasm of breast    Ruchi Islas MD    Office Visit    5 months ago Sensorineural hearing loss, bilateral    [facility] Renea Enamorado    Office Visit    5 months ago Bilateral hearing loss, unspecified hearing loss type    [facility] Mesfin Goyal MD    Office Visit    8 months ago Syncope, unspecified syncope type    Sandra Leyva MD    Office Visit    1 year ago Personal history of colonic polyps    Gastoenterology - Devin Diaz, [facility]    Nurse Only

## 2022-06-23 ENCOUNTER — OFFICE VISIT (OUTPATIENT)
Dept: INTERNAL MEDICINE CLINIC | Facility: CLINIC | Age: 72
End: 2022-06-23
Payer: MEDICARE

## 2022-06-23 VITALS
TEMPERATURE: 98 F | HEART RATE: 74 BPM | DIASTOLIC BLOOD PRESSURE: 85 MMHG | HEIGHT: 62 IN | RESPIRATION RATE: 18 BRPM | SYSTOLIC BLOOD PRESSURE: 135 MMHG | BODY MASS INDEX: 39.84 KG/M2 | WEIGHT: 216.5 LBS

## 2022-06-23 DIAGNOSIS — I10 PRIMARY HYPERTENSION: Primary | ICD-10-CM

## 2022-06-23 PROCEDURE — 99213 OFFICE O/P EST LOW 20 MIN: CPT | Performed by: INTERNAL MEDICINE

## 2022-06-23 PROCEDURE — 1126F AMNT PAIN NOTED NONE PRSNT: CPT | Performed by: INTERNAL MEDICINE

## 2022-08-19 RX ORDER — LISINOPRIL 20 MG/1
TABLET ORAL
Qty: 90 TABLET | Refills: 1 | Status: SHIPPED | OUTPATIENT
Start: 2022-08-19

## 2022-08-19 RX ORDER — SIMVASTATIN 20 MG
TABLET ORAL
Qty: 90 TABLET | Refills: 1 | Status: SHIPPED | OUTPATIENT
Start: 2022-08-19

## 2022-08-19 NOTE — TELEPHONE ENCOUNTER
Refill passed per [address] [place] [address] protocol. Requested Prescriptions   Pending Prescriptions Disp Refills    SIMVASTATIN 20 MG Oral Tab [Pharmacy Med Name: SIMVASTATIN 20 MG TABLET] 90 tablet 1     Sig: TAKE 1 TABLET BY MOUTH EVERY DAY AT NIGHT        Cholesterol Medication Protocol Passed - 8/19/2022  1:13 AM        Passed - ALT in past 12 months        Passed - LDL in past 12 months        Passed - Last ALT < 80       Lab Results   Component Value Date    ALT 29 10/18/2021             Passed - Last LDL < 130     Lab Results   Component Value Date    LDL 86 10/18/2021               Passed - In person appointment or virtual visit in the past 12 mos or appointment in next 3 mos       Recent Outpatient Visits              1 month ago Primary hypertension    [address] [place] [address], [address], Parth Bills MD    Office Visit    10 months ago Encounter for screening mammogram for malignant neoplasm of breast    [facility], Parth Bills MD    Office Visit    11 months ago Sensorineural hearing loss, bilateral    [facility] Sandra Armendariz, Au.BUDDY    Office Visit    11 months ago Bilateral hearing loss, unspecified hearing loss type    [facility] for Megan Hernandes MD    Office Visit    1 year ago Syncope, unspecified syncope type    Rod Alonso MD    Office Visit                    LISINOPRIL 20 MG Oral Tab [Pharmacy Med Name: LISINOPRIL 20 MG TABLET] 90 tablet 1     Sig: TAKE 1 TABLET BY MOUTH EVERY DAY        Hypertensive Medications Protocol Failed - 8/19/2022  1:13 AM        Failed - CMP or BMP in past 6 months     No results found for this or any previous visit (from the past 4392 hour(s)).               Passed - In person appointment in the past 12 or next 3 months       Recent Outpatient Visits              1 month ago Primary hypertension    [address] [place] [address], Liyah Flood, Zuhair Carlos MD    Office Visit    10 months ago Encounter for screening mammogram for malignant neoplasm of breast    [facility], Liyah Flood, Herman Miller MD    Office Visit    11 months ago Sensorineural hearing loss, bilateral    [facility] Dellar Lina, Au.D    Office Visit    11 months ago Bilateral hearing loss, unspecified hearing loss type    [facility], Brittany Osborne MD    Office Visit    1 year ago Syncope, unspecified syncope type    Danica Puri MD    Office Visit                 Passed - Last BP reading less than 140/90     BP Readings from Last 1 Encounters:  06/23/22 : 135/85                Passed - In person appointment or virtual visit in the past 6 months       Recent Outpatient Visits              1 month ago Primary hypertension    [facility], Herman Robert MD    Office Visit    10 months ago Encounter for screening mammogram for malignant neoplasm of breast    [facility], Herman Robert MD    Office Visit    11 months ago Sensorineural hearing loss, bilateral    [facility] Dellar Lina, Au.D    Office Visit    11 months ago Bilateral hearing loss, unspecified hearing loss type    [facility], Brittany Osborne MD    Office Visit    1 year ago Syncope, unspecified syncope type    Danica Puri MD    Office Visit                 Passed - GFR > 50     No results found for: [facility]                          Recent Outpatient Visits              1 month ago Primary hypertension    Herman Camargo MD    Office Visit    10 months ago Encounter for screening mammogram for malignant neoplasm of breast    [facility] Kapil Garcia MD    Office Visit    11 months ago Sensorineural hearing loss, bilateral    [facility] Lossie Renea Ernandez    Office Visit    11 months ago Bilateral hearing loss, unspecified hearing loss type    [facility] [place] MD    Office Visit    1 year ago Syncope, unspecified syncope type    Pablo [place] MD    Office Visit

## 2023-01-30 ENCOUNTER — OFFICE VISIT (OUTPATIENT)
Dept: INTERNAL MEDICINE CLINIC | Facility: CLINIC | Age: 73
End: 2023-01-30

## 2023-01-30 VITALS
OXYGEN SATURATION: 96 % | SYSTOLIC BLOOD PRESSURE: 135 MMHG | HEIGHT: 62 IN | DIASTOLIC BLOOD PRESSURE: 85 MMHG | TEMPERATURE: 97 F | WEIGHT: 211 LBS | BODY MASS INDEX: 38.83 KG/M2 | HEART RATE: 80 BPM

## 2023-01-30 DIAGNOSIS — Z00.00 ENCOUNTER FOR ANNUAL HEALTH EXAMINATION: ICD-10-CM

## 2023-01-30 DIAGNOSIS — Z12.31 ENCOUNTER FOR SCREENING MAMMOGRAM FOR MALIGNANT NEOPLASM OF BREAST: ICD-10-CM

## 2023-01-30 DIAGNOSIS — Z00.00 ANNUAL PHYSICAL EXAM: Primary | ICD-10-CM

## 2023-01-30 PROBLEM — I10 ESSENTIAL HYPERTENSION: Status: ACTIVE | Noted: 2022-12-15

## 2023-01-30 LAB
ALBUMIN SERPL-MCNC: 3.6 G/DL (ref 3.4–5)
ALBUMIN/GLOB SERPL: 1 {RATIO} (ref 1–2)
ALP LIVER SERPL-CCNC: 108 U/L
ALT SERPL-CCNC: 25 U/L
ANION GAP SERPL CALC-SCNC: 4 MMOL/L (ref 0–18)
AST SERPL-CCNC: 26 U/L (ref 15–37)
BASOPHILS # BLD AUTO: 0.04 X10(3) UL (ref 0–0.2)
BASOPHILS NFR BLD AUTO: 0.5 %
BILIRUB SERPL-MCNC: 0.4 MG/DL (ref 0.1–2)
BUN BLD-MCNC: 9 MG/DL (ref 7–18)
BUN/CREAT SERPL: 11.3 (ref 10–20)
CALCIUM BLD-MCNC: 9.1 MG/DL (ref 8.5–10.1)
CHLORIDE SERPL-SCNC: 105 MMOL/L (ref 98–112)
CHOLEST SERPL-MCNC: 162 MG/DL (ref ?–200)
CO2 SERPL-SCNC: 31 MMOL/L (ref 21–32)
CREAT BLD-MCNC: 0.8 MG/DL
DEPRECATED RDW RBC AUTO: 43.8 FL (ref 35.1–46.3)
EOSINOPHIL # BLD AUTO: 0.1 X10(3) UL (ref 0–0.7)
EOSINOPHIL NFR BLD AUTO: 1.3 %
ERYTHROCYTE [DISTWIDTH] IN BLOOD BY AUTOMATED COUNT: 12.5 % (ref 11–15)
FASTING PATIENT LIPID ANSWER: YES
FASTING STATUS PATIENT QL REPORTED: YES
GFR SERPLBLD BASED ON 1.73 SQ M-ARVRAT: 77 ML/MIN/1.73M2 (ref 60–?)
GLOBULIN PLAS-MCNC: 3.7 G/DL (ref 2.8–4.4)
GLUCOSE BLD-MCNC: 94 MG/DL (ref 70–99)
HCT VFR BLD AUTO: 45.4 %
HDLC SERPL-MCNC: 46 MG/DL (ref 40–59)
HGB BLD-MCNC: 14 G/DL
IMM GRANULOCYTES # BLD AUTO: 0.01 X10(3) UL (ref 0–1)
IMM GRANULOCYTES NFR BLD: 0.1 %
LDLC SERPL CALC-MCNC: 84 MG/DL (ref ?–100)
LYMPHOCYTES # BLD AUTO: 1.33 X10(3) UL (ref 1–4)
LYMPHOCYTES NFR BLD AUTO: 17.9 %
MCH RBC QN AUTO: 29.2 PG (ref 26–34)
MCHC RBC AUTO-ENTMCNC: 30.8 G/DL (ref 31–37)
MCV RBC AUTO: 94.8 FL
MONOCYTES # BLD AUTO: 0.48 X10(3) UL (ref 0.1–1)
MONOCYTES NFR BLD AUTO: 6.5 %
NEUTROPHILS # BLD AUTO: 5.47 X10 (3) UL (ref 1.5–7.7)
NEUTROPHILS # BLD AUTO: 5.47 X10(3) UL (ref 1.5–7.7)
NEUTROPHILS NFR BLD AUTO: 73.7 %
NONHDLC SERPL-MCNC: 116 MG/DL (ref ?–130)
OSMOLALITY SERPL CALC.SUM OF ELEC: 288 MOSM/KG (ref 275–295)
PLATELET # BLD AUTO: 291 10(3)UL (ref 150–450)
POTASSIUM SERPL-SCNC: 3.8 MMOL/L (ref 3.5–5.1)
PROT SERPL-MCNC: 7.3 G/DL (ref 6.4–8.2)
RBC # BLD AUTO: 4.79 X10(6)UL
SODIUM SERPL-SCNC: 140 MMOL/L (ref 136–145)
TRIGL SERPL-MCNC: 189 MG/DL (ref 30–149)
TSI SER-ACNC: 2.6 MIU/ML (ref 0.36–3.74)
VLDLC SERPL CALC-MCNC: 30 MG/DL (ref 0–30)
WBC # BLD AUTO: 7.4 X10(3) UL (ref 4–11)

## 2023-01-30 PROCEDURE — 1126F AMNT PAIN NOTED NONE PRSNT: CPT | Performed by: INTERNAL MEDICINE

## 2023-01-30 PROCEDURE — 36415 COLL VENOUS BLD VENIPUNCTURE: CPT | Performed by: INTERNAL MEDICINE

## 2023-01-30 RX ORDER — AZITHROMYCIN 250 MG/1
TABLET, FILM COATED ORAL
Qty: 6 TABLET | Refills: 0 | Status: SHIPPED | OUTPATIENT
Start: 2023-01-30 | End: 2023-02-04

## 2023-01-30 RX ORDER — BENZONATATE 100 MG/1
100 CAPSULE ORAL 3 TIMES DAILY PRN
Qty: 20 CAPSULE | Refills: 0 | Status: SHIPPED | OUTPATIENT
Start: 2023-01-30 | End: 2023-02-06

## 2023-02-07 RX ORDER — LISINOPRIL 20 MG/1
20 TABLET ORAL DAILY
Qty: 90 TABLET | Refills: 1 | Status: SHIPPED | OUTPATIENT
Start: 2023-02-07

## 2023-02-07 NOTE — TELEPHONE ENCOUNTER
Refill passed per [facility] [employer] protocol.     Requested Prescriptions   Pending Prescriptions Disp Refills    LISINOPRIL 20 MG Oral Tab [Pharmacy Med Name: LISINOPRIL 20 MG TABLET] 90 tablet 1     Sig: TAKE 1 TABLET BY MOUTH EVERY DAY       Hypertensive Medications Protocol Passed - 2/7/2023 12:05 AM        Passed - In person appointment in the past 12 or next 3 months     Recent Outpatient Visits              1 week ago Annual physical exam    [address] [facility], Antionette Carpio MD    Office Visit    7 months ago Primary hypertension    [address] [facility], Antionette Carpio MD    Office Visit    1 year ago Encounter for screening mammogram for malignant neoplasm of breast    [address] [facility], Antionette Carpio MD    Office Visit    1 year ago Sensorineural hearing loss, bilateral    [address], [address], Itzel Fleming Au.D    Office Visit    1 year ago Bilateral hearing loss, unspecified hearing loss type    [facility], [address], Faina Osborne MD    Office Visit          Future Appointments         Provider Department Appt Notes    In 3 weeks [facility] SYSTEM OF THE [address]                Passed - Last BP reading less than 140/90     BP Readings from Last 1 Encounters:  01/30/23 : 135/85              Passed - CMP or BMP in past 6 months     Recent Results (from the past 4392 hour(s))   COMP METABOLIC PANEL (14)    Collection Time: 01/30/23 11:46 AM   Result Value Ref Range    Glucose 94 70 - 99 mg/dL    Sodium 140 136 - 145 mmol/L    Potassium 3.8 3.5 - 5.1 mmol/L    Chloride 105 98 - 112 mmol/L    CO2 31.0 21.0 - 32.0 mmol/L    Anion Gap 4 0 - 18 mmol/L    BUN 9 7 - 18 mg/dL    Creatinine 0.80 0.55 - 1.02 mg/dL    BUN/CREA Ratio 11.3 10.0 - 20.0    Calcium, Total 9.1 8.5 - 10.1 mg/dL    Calculated Osmolality 288 275 - 295 mOsm/kg    eGFR-Cr 77 >=60 mL/min/1.73m2    ALT 25 13 - 56 U/L    AST 26 15 - 37 U/L    Alkaline Phosphatase 108 55 - 142 U/L    Bilirubin, Total 0.4 0.1 - 2.0 mg/dL    Total Protein 7.3 6.4 - 8.2 g/dL    Albumin 3.6 3.4 - 5.0 g/dL    Globulin  3.7 2.8 - 4.4 g/dL    A/G Ratio 1.0 1.0 - 2.0    Patient Fasting for CMP? Yes      *Note: Due to a large number of results and/or encounters for the requested time period, some results have not been displayed. A complete set of results can be found in Results Review.                Passed - In person appointment or virtual visit in the past 6 months     Recent Outpatient Visits              1 week ago Annual physical exam    Rashida Miller MD    Office Visit    7 months ago Primary hypertension    [address] Raymundo Smith MD    Office Visit    1 year ago Encounter for screening mammogram for malignant neoplasm of breast    Raymundo Leonardo MD    Office Visit    1 year ago Sensorineural hearing loss, bilateral    [address] Delmar Smith Au.D    Office Visit    1 year ago Bilateral hearing loss, unspecified hearing loss type    [address] [facility],[address], [address] [facility],[address], Enrique Osborne MD    Office Visit          Future Appointments         Provider Department Appt Notes    In 3 weeks [facility] OF THE [facility] > 50     GFR Evaluation  EGFRCR: 77 , resulted on 1/30/2023             Recent Outpatient Visits              1 week ago Annual physical exam    Rashida Miller MD    Office Visit    7 months ago Primary hypertension    [facility] Raymundo Smith MD    Office Visit    1 year ago Encounter for screening mammogram for malignant neoplasm of breast    Hugh Wheatley MD    Office Visit    1 year ago Sensorineural hearing loss, bilateral    [facility], [address], Itzel Fleming Au.D    Office Visit    1 year ago Bilateral hearing loss, unspecified hearing loss type    [facility], [address], Faina Osborne MD    Office Visit          Future Appointments         Provider Department Appt Notes    In 3 weeks [facility] SYSTEM OF THE [facility] [address]

## 2023-02-28 ENCOUNTER — NURSE TRIAGE (OUTPATIENT)
Dept: INTERNAL MEDICINE CLINIC | Facility: CLINIC | Age: 73
End: 2023-02-28

## 2023-02-28 RX ORDER — BENZONATATE 100 MG/1
100 CAPSULE ORAL 3 TIMES DAILY PRN
Qty: 20 CAPSULE | Refills: 0 | Status: CANCELLED
Start: 2023-02-28 | End: 2023-03-07

## 2023-02-28 NOTE — TELEPHONE ENCOUNTER
I will send her to [facility] for the cough make sure she drinks a lot of fluids, I will suggest also to do COVID test as well, if cough persist then she needs to follow-up if any fever chills shortness of breath call us or go to er

## 2023-03-02 NOTE — TELEPHONE ENCOUNTER
Spoke to patient (name and  of patient verified). She reports she had a voicemail to all Dr. Patel Push office and is unsure if it is regarding something new. Per chart review, no new test results or documentation noted, patient verbalizes understanding.

## 2023-03-06 ENCOUNTER — HOSPITAL ENCOUNTER (OUTPATIENT)
Dept: MAMMOGRAPHY | Facility: HOSPITAL | Age: 73
Discharge: HOME | End: 2023-03-06
Attending: INTERNAL MEDICINE
Payer: MEDICARE

## 2023-03-06 DIAGNOSIS — Z12.31 ENCOUNTER FOR SCREENING MAMMOGRAM FOR MALIGNANT NEOPLASM OF BREAST: ICD-10-CM

## 2023-03-06 PROCEDURE — 77063 BREAST TOMOSYNTHESIS BI: CPT | Performed by: INTERNAL MEDICINE

## 2023-03-06 PROCEDURE — 77067 SCR MAMMO BI INCL CAD: CPT | Performed by: INTERNAL MEDICINE

## 2023-03-22 ENCOUNTER — HOSPITAL ENCOUNTER (OUTPATIENT)
Dept: MAMMOGRAPHY | Facility: HOSPITAL | Age: 73
Discharge: HOME | End: 2023-03-22
Attending: INTERNAL MEDICINE
Payer: MEDICARE

## 2023-03-22 DIAGNOSIS — R92.8 ABNORMAL MAMMOGRAM: ICD-10-CM

## 2023-03-22 PROCEDURE — 77061 BREAST TOMOSYNTHESIS UNI: CPT | Performed by: INTERNAL MEDICINE

## 2023-03-22 PROCEDURE — 77065 DX MAMMO INCL CAD UNI: CPT | Performed by: INTERNAL MEDICINE

## 2023-03-23 NOTE — PROGRESS NOTES
Pt informed Mammogram shows benign findings will need short term diagnostic mammogram right breast in 6 months.

## 2023-07-01 ENCOUNTER — HOSPITAL ENCOUNTER (EMERGENCY)
Facility: HOSPITAL | Age: 73
Discharge: HOME | End: 2023-07-02
Attending: EMERGENCY MEDICINE
Payer: MEDICARE

## 2023-07-01 DIAGNOSIS — T78.3XXA ANGIOEDEMA OF LIPS, INITIAL ENCOUNTER: Primary | ICD-10-CM

## 2023-07-01 DIAGNOSIS — I10 ELEVATED BLOOD PRESSURE READING WITH DIAGNOSIS OF HYPERTENSION: ICD-10-CM

## 2023-07-01 PROCEDURE — 99284 EMERGENCY DEPT VISIT MOD MDM: CPT

## 2023-07-01 PROCEDURE — 96365 THER/PROPH/DIAG IV INF INIT: CPT

## 2023-07-01 PROCEDURE — 96375 TX/PRO/DX INJ NEW DRUG ADDON: CPT

## 2023-07-01 PROCEDURE — 93005 ELECTROCARDIOGRAM TRACING: CPT

## 2023-07-01 PROCEDURE — 93010 ELECTROCARDIOGRAM REPORT: CPT

## 2023-07-01 PROCEDURE — S0028 INJECTION, FAMOTIDINE, 20 MG: HCPCS | Performed by: EMERGENCY MEDICINE

## 2023-07-01 RX ORDER — HYDRALAZINE HYDROCHLORIDE 20 MG/ML
10 INJECTION INTRAMUSCULAR; INTRAVENOUS ONCE
Status: COMPLETED | OUTPATIENT
Start: 2023-07-01 | End: 2023-07-01

## 2023-07-01 RX ORDER — FAMOTIDINE 10 MG/ML
20 INJECTION, SOLUTION INTRAVENOUS ONCE
Status: COMPLETED | OUTPATIENT
Start: 2023-07-01 | End: 2023-07-01

## 2023-07-01 RX ORDER — METHYLPREDNISOLONE SODIUM SUCCINATE 125 MG/2ML
125 INJECTION, POWDER, LYOPHILIZED, FOR SOLUTION INTRAMUSCULAR; INTRAVENOUS ONCE
Status: COMPLETED | OUTPATIENT
Start: 2023-07-01 | End: 2023-07-01

## 2023-07-01 RX ORDER — TRANEXAMIC ACID 10 MG/ML
1000 INJECTION, SOLUTION INTRAVENOUS ONCE
Status: COMPLETED | OUTPATIENT
Start: 2023-07-01 | End: 2023-07-02

## 2023-07-01 RX ORDER — DIPHENHYDRAMINE HYDROCHLORIDE 50 MG/ML
50 INJECTION INTRAMUSCULAR; INTRAVENOUS ONCE
Status: COMPLETED | OUTPATIENT
Start: 2023-07-01 | End: 2023-07-01

## 2023-07-02 VITALS
BODY MASS INDEX: 36.62 KG/M2 | OXYGEN SATURATION: 98 % | TEMPERATURE: 97 F | WEIGHT: 199 LBS | DIASTOLIC BLOOD PRESSURE: 87 MMHG | RESPIRATION RATE: 18 BRPM | SYSTOLIC BLOOD PRESSURE: 143 MMHG | HEART RATE: 78 BPM | HEIGHT: 62 IN

## 2023-07-02 LAB
ATRIAL RATE: 77 BPM
P AXIS: 53 DEGREES
P-R INTERVAL: 146 MS
Q-T INTERVAL: 412 MS
QRS DURATION: 90 MS
QTC CALCULATION (BEZET): 466 MS
R AXIS: -38 DEGREES
T AXIS: 95 DEGREES
VENTRICULAR RATE: 77 BPM

## 2023-07-02 RX ORDER — PREDNISONE 20 MG/1
40 TABLET ORAL DAILY
Qty: 6 TABLET | Refills: 0 | Status: SHIPPED | OUTPATIENT
Start: 2023-07-02 | End: 2023-07-05

## 2023-07-02 RX ORDER — LOSARTAN POTASSIUM 100 MG/1
100 TABLET ORAL DAILY
Qty: 30 TABLET | Refills: 0 | Status: SHIPPED | OUTPATIENT
Start: 2023-07-02 | End: 2023-08-01

## 2023-07-02 NOTE — ED INITIAL ASSESSMENT (HPI)
Facial swelling that began today at 1900. Patient reports started after dinner, no new foods or known allergies. Patient reports pain R side throat, speaking in full sentences.

## 2023-07-02 NOTE — ED QUICK NOTES
TXA started, patient aware of signs and symptoms that she needs to notify RN for. Patient on cardiac monitor, NIBP and pulse ox.

## 2023-07-02 NOTE — ED QUICK NOTES
Patient stated she is on lisinopril and takes it in the morning, took x1 pill of benadryl when it occurred. Stated swelling has not gotten better since benadryl was taken. Patients SBP noted to be in the 200's.

## 2023-07-02 NOTE — DISCHARGE INSTRUCTIONS
Return if difficulty breathing or swallowing, or swelling  Follow-up with your doctor in 2 days for blood pressure management  Stop lisinopril and start losartan  Prednisone daily for 2 more days

## 2023-07-05 ENCOUNTER — OFFICE VISIT (OUTPATIENT)
Dept: INTERNAL MEDICINE CLINIC | Facility: CLINIC | Age: 73
End: 2023-07-05

## 2023-07-05 VITALS
DIASTOLIC BLOOD PRESSURE: 92 MMHG | HEART RATE: 59 BPM | TEMPERATURE: 98 F | WEIGHT: 200 LBS | OXYGEN SATURATION: 97 % | SYSTOLIC BLOOD PRESSURE: 153 MMHG | BODY MASS INDEX: 36.8 KG/M2 | HEIGHT: 62 IN

## 2023-07-05 DIAGNOSIS — I10 PRIMARY HYPERTENSION: Primary | ICD-10-CM

## 2023-07-05 PROCEDURE — 1126F AMNT PAIN NOTED NONE PRSNT: CPT | Performed by: INTERNAL MEDICINE

## 2023-07-05 PROCEDURE — 99214 OFFICE O/P EST MOD 30 MIN: CPT | Performed by: INTERNAL MEDICINE

## 2023-07-05 RX ORDER — HYDROCHLOROTHIAZIDE 12.5 MG/1
12.5 CAPSULE, GELATIN COATED ORAL DAILY
Qty: 90 CAPSULE | Refills: 0 | Status: SHIPPED | OUTPATIENT
Start: 2023-07-05

## 2023-07-05 NOTE — PROGRESS NOTES
Subjective:     Patient ID: Zack King is a 74year old female. HPI    History/Other: She came in today for follow-up from emergency room. According to the patient she went to ER due to swelling of the lips mouth. Patient was on lisinopril for long time. They gave her Benadryl steroids and she was sent home. They stopped lisinopril and put her on losartan. She is checking her blood pressure at home is running on the higher side  Review of Systems   Constitutional: Negative. HENT: Negative. Respiratory: Negative. Genitourinary: Negative. Current Outpatient Medications   Medication Sig Dispense Refill    losartan 100 MG Oral Tab Take 1 tablet (100 mg total) by mouth daily. 30 tablet 0    simvastatin 20 MG Oral Tab Take 1 tablet (20 mg total) by mouth nightly. 90 tablet 1    Ascorbic Acid (VITAMIN C) 1000 MG Oral Tab Take 1 tablet (1,000 mg total) by mouth daily. Cholecalciferol (VITAMIN D) 50 MCG (2000 UT) Oral Cap Take by mouth. Tobramycin Sulfate 0.3 % Ophthalmic Solution INSTILL ONE DROP IN AFFECTED EYE FOUR TIMES DAILY      multiple vitamin Oral Chew Tab Chew 1 tablet by mouth daily. Doxycycline Hyclate 50 MG Oral Cap Take 50 mg by mouth every other day.        Allergies:  Lisinopril              ANGIOEDEMA, FACE FLUSHING, SWELLING  Naproxen                HIVES    Comment:Other reaction(s): NAPROXEN    Past Medical History:   Diagnosis Date    Basal cell carcinoma 2003    Basal cell carcinoma 2002    High blood pressure     High cholesterol     Osteoarthritis     Other and unspecified hyperlipidemia     Unspecified essential hypertension     Visual impairment     glasses      Past Surgical History:   Procedure Laterality Date    COLONOSCOPY  10/2015    COLONOSCOPY  10/2020    COLONOSCOPY N/A 10/28/2020    Procedure: COLONOSCOPY;  Surgeon: Charlie Gresham MD;  Location: [address] [place] [address] ENDOSCOPY    DILATION/CURETTAGE,DIAGNOSTIC      OTHER      Excision of basal cell carcinoma with skin graft     OTHER Left 05/2005    wrist fx surgery    OTHER SURGICAL HISTORY  12/12/2017    left knee torn meniscus    TONSILLECTOMY        Family History   Problem Relation Age of Onset    Heart Disease Father 61        Cause of death    Ovarian Cancer Mother 80        Cause of death     Cancer Paternal Grandfather         Stomach     Heart Disease Other     No Known Problems Self     No Known Problems Sister     No Known Problems Daughter     No Known Problems Maternal Grandmother     No Known Problems Paternal Grandmother     No Known Problems Maternal Aunt     No Known Problems Paternal Aunt     No Known Problems Maternal Cousin Female     No Known Problems Maternal Cousin Male     No Known Problems Paternal Cousin Female     No Known Problems Paternal Cousin Male     Breast Cancer Neg     DCIS Neg     LCIS Neg     BRCA gene + Neg     Ashkenazi Sabianist Descent Neg       Social History:   Social History     Socioeconomic History    Marital status: Single   Tobacco Use    Smoking status: Never    Smokeless tobacco: Never   Vaping Use    Vaping Use: Never used   Substance and Sexual Activity    Alcohol use: No     Alcohol/week: 0.0 standard drinks of alcohol     Comment: Rarely    Drug use: No   Other Topics Concern    Caffeine Concern Yes     Comment: Coffee, 1 cup per day     Reaction to local anesthetic No        Objective:   Physical Exam  Vitals and nursing note reviewed. Constitutional:       Appearance: Normal appearance. HENT:      Head: Normocephalic and atraumatic. Cardiovascular:      Rate and Rhythm: Normal rate and regular rhythm. Pulses: Normal pulses. Heart sounds: Normal heart sounds. Pulmonary:      Effort: Pulmonary effort is normal.      Breath sounds: Normal breath sounds. Musculoskeletal:      Cervical back: Normal range of motion and neck supple. Skin:     General: Skin is warm and dry. Neurological:      Mental Status: She is alert. Mental status is at baseline. Assessment & Plan:   No diagnosis found. Angioedema due to lisinopril - resolved     Htn noted on the higher side I will in add hydrochlorothiazide continue with losartan monitor blood pressure and follow-up in  3-4 weeks   No orders of the defined types were placed in this encounter.       Meds This Visit:  Requested Prescriptions      No prescriptions requested or ordered in this encounter       Imaging & Referrals:  None

## 2023-07-24 ENCOUNTER — PATIENT MESSAGE (OUTPATIENT)
Dept: INTERNAL MEDICINE CLINIC | Facility: CLINIC | Age: 73
End: 2023-07-24

## 2023-07-25 RX ORDER — LOSARTAN POTASSIUM 100 MG/1
100 TABLET ORAL DAILY
Qty: 30 TABLET | Refills: 0 | Status: SHIPPED | OUTPATIENT
Start: 2023-07-25 | End: 2023-08-24

## 2023-07-25 NOTE — TELEPHONE ENCOUNTER
Dr. Lyn Hurlye, please advise on refill prior to appointment. From: Brigette Chen  To: Antoine Ordaz MD  Sent: 7/24/2023 11:43 AM CDT  Subject: Losartan Potassium    My prescription for this medication which was prescribed to be by Dr. Neftaly Owusu on July 2, 2023 at [facility] is about yo run  out. I have only 8 pills left which will not cover me until my appointment on August 9 with dr. Lyn Hurley  Can she renew this prescription for me. [facility] pharmacy at [facility] and [address] [place] [address] is the pharmacy.     Thank you  Brenda Cho  [phone]

## 2023-08-09 ENCOUNTER — OFFICE VISIT (OUTPATIENT)
Dept: INTERNAL MEDICINE CLINIC | Facility: CLINIC | Age: 73
End: 2023-08-09

## 2023-08-09 VITALS
OXYGEN SATURATION: 96 % | HEIGHT: 62 IN | SYSTOLIC BLOOD PRESSURE: 95 MMHG | DIASTOLIC BLOOD PRESSURE: 64 MMHG | HEART RATE: 68 BPM | BODY MASS INDEX: 36.62 KG/M2 | WEIGHT: 199 LBS | TEMPERATURE: 98 F

## 2023-08-09 DIAGNOSIS — I10 PRIMARY HYPERTENSION: Primary | ICD-10-CM

## 2023-08-09 DIAGNOSIS — R06.81 APNEA: ICD-10-CM

## 2023-08-09 LAB
ANION GAP SERPL CALC-SCNC: 5 MMOL/L (ref 0–18)
BUN BLD-MCNC: 17 MG/DL (ref 7–18)
BUN/CREAT SERPL: 15.6 (ref 10–20)
CALCIUM BLD-MCNC: 8.9 MG/DL (ref 8.5–10.1)
CHLORIDE SERPL-SCNC: 106 MMOL/L (ref 98–112)
CO2 SERPL-SCNC: 31 MMOL/L (ref 21–32)
CREAT BLD-MCNC: 1.09 MG/DL
EGFRCR SERPLBLD CKD-EPI 2021: 53 ML/MIN/1.73M2 (ref 60–?)
FASTING STATUS PATIENT QL REPORTED: NO
GLUCOSE BLD-MCNC: 90 MG/DL (ref 70–99)
OSMOLALITY SERPL CALC.SUM OF ELEC: 295 MOSM/KG (ref 275–295)
POTASSIUM SERPL-SCNC: 3.7 MMOL/L (ref 3.5–5.1)
SODIUM SERPL-SCNC: 142 MMOL/L (ref 136–145)

## 2023-08-09 PROCEDURE — 1126F AMNT PAIN NOTED NONE PRSNT: CPT | Performed by: INTERNAL MEDICINE

## 2023-08-09 PROCEDURE — 99214 OFFICE O/P EST MOD 30 MIN: CPT | Performed by: INTERNAL MEDICINE

## 2023-08-09 PROCEDURE — 36415 COLL VENOUS BLD VENIPUNCTURE: CPT | Performed by: INTERNAL MEDICINE

## 2023-08-09 RX ORDER — LISINOPRIL 20 MG/1
20 TABLET ORAL DAILY
Qty: 90 TABLET | Refills: 1 | OUTPATIENT
Start: 2023-08-09

## 2023-08-09 NOTE — PROGRESS NOTES
Subjective:     Patient ID: Dario Maynard is a 74year old female. Hypertension        History/Other:   She came in today for follow-up on her blood pressure. However she is monitoring her blood pressure at home is much better than before. She is taking her medications regularly. She denies any dizziness or lightheadedness. She also wants to be tested for sleep apnea. She snores at night. She feels tired in the morning. Review of Systems   Constitutional: Negative. HENT: Negative. Respiratory: Negative. Cardiovascular: Negative. Gastrointestinal: Negative. Genitourinary: Negative. Musculoskeletal: Negative. Skin: Negative. Neurological: Negative. Hematological: Negative. Psychiatric/Behavioral: Negative. Current Outpatient Medications   Medication Sig Dispense Refill    losartan 100 MG Oral Tab Take 1 tablet (100 mg total) by mouth daily. 30 tablet 0    hydroCHLOROthiazide 12.5 MG Oral Cap Take 1 capsule (12.5 mg total) by mouth daily. 90 capsule 0    simvastatin 20 MG Oral Tab Take 1 tablet (20 mg total) by mouth nightly. 90 tablet 1    Cholecalciferol (VITAMIN D) 50 MCG (2000 UT) Oral Cap Take by mouth. Tobramycin Sulfate 0.3 % Ophthalmic Solution INSTILL ONE DROP IN AFFECTED EYE FOUR TIMES DAILY      multiple vitamin Oral Chew Tab Chew 1 tablet by mouth daily.        Allergies:  Lisinopril              ANGIOEDEMA, FACE FLUSHING, SWELLING  Naproxen                HIVES    Comment:Other reaction(s): NAPROXEN    Past Medical History:   Diagnosis Date    Basal cell carcinoma 2003    Basal cell carcinoma 2002    High blood pressure     High cholesterol     Osteoarthritis     Other and unspecified hyperlipidemia     Unspecified essential hypertension     Visual impairment     glasses      Past Surgical History:   Procedure Laterality Date    COLONOSCOPY  10/2015    COLONOSCOPY  10/2020    COLONOSCOPY N/A 10/28/2020    Procedure: COLONOSCOPY;  Surgeon: Shey Moreira Abdulkadir Hill MD;  Location: [facility] ENDOSCOPY    DILATION/CURETTAGE,DIAGNOSTIC      OTHER      Excision of basal cell carcinoma with skin graft     OTHER Left 05/2005    wrist fx surgery    OTHER SURGICAL HISTORY  12/12/2017    left knee torn meniscus    TONSILLECTOMY        Family History   Problem Relation Age of Onset    Heart Disease Father 61        Cause of death    Ovarian Cancer Mother 80        Cause of death     Cancer Paternal Grandfather         Stomach     Heart Disease Other     No Known Problems Self     No Known Problems Sister     No Known Problems Daughter     No Known Problems Maternal Grandmother     No Known Problems Paternal Grandmother     No Known Problems Maternal Aunt     No Known Problems Paternal Aunt     No Known Problems Maternal Cousin Female     No Known Problems Maternal Cousin Male     No Known Problems Paternal Cousin Female     No Known Problems Paternal Cousin Male     Breast Cancer Neg     DCIS Neg     LCIS Neg     BRCA gene + Neg     Ashkenazi Religion Descent Neg       Social History:   Social History     Socioeconomic History    Marital status: Single   Tobacco Use    Smoking status: Never    Smokeless tobacco: Never   Vaping Use    Vaping Use: Never used   Substance and Sexual Activity    Alcohol use: No     Alcohol/week: 0.0 standard drinks of alcohol     Comment: Rarely    Drug use: No   Other Topics Concern    Caffeine Concern Yes     Comment: Coffee, 1 cup per day     Reaction to local anesthetic No        Objective:   Physical Exam  Vitals and nursing note reviewed. Constitutional:       Appearance: Normal appearance. HENT:      Head: Normocephalic and atraumatic. Cardiovascular:      Rate and Rhythm: Normal rate and regular rhythm. Pulses: Normal pulses. Heart sounds: Normal heart sounds. Pulmonary:      Breath sounds: Normal breath sounds. Abdominal:      Palpations: Abdomen is soft.    Musculoskeletal:      Cervical back: Normal range of motion and neck supple. Skin:     General: Skin is warm. Neurological:      Mental Status: She is alert. Mental status is at baseline.          Assessment & Plan:   Primary hypertension  (primary encounter diagnosis)  Blood pressure well-controlled we will continue with current medication will check BMP    Rule out sleep apnea will order sleep study  Orders Placed This Encounter      Basic Metabolic Panel (8) [E]      Meds This Visit:  Requested Prescriptions      No prescriptions requested or ordered in this encounter       Imaging & Referrals:  None

## 2023-08-10 NOTE — PROGRESS NOTES
Message left for patient that her Creatinine little off we will monitor we will repeat her BMP in 1 month

## 2023-08-16 ENCOUNTER — OFFICE VISIT (OUTPATIENT)
Dept: SLEEP CENTER | Age: 73
End: 2023-08-16
Attending: INTERNAL MEDICINE
Payer: MEDICARE

## 2023-08-16 DIAGNOSIS — R06.81 APNEA: ICD-10-CM

## 2023-08-16 PROCEDURE — 95810 POLYSOM 6/> YRS 4/> PARAM: CPT

## 2023-09-08 RX ORDER — SIMVASTATIN 20 MG
20 TABLET ORAL NIGHTLY
Qty: 90 TABLET | Refills: 3 | Status: SHIPPED | OUTPATIENT
Start: 2023-09-08

## 2023-09-08 NOTE — TELEPHONE ENCOUNTER
Refill passed per [facility] protocol.     Requested Prescriptions   Pending Prescriptions Disp Refills    SIMVASTATIN 20 MG Oral Tab [Pharmacy Med Name: SIMVASTATIN 20 MG TABLET] 90 tablet 1     Sig: TAKE 1 TABLET BY MOUTH EVERY DAY AT NIGHT       Cholesterol Medication Protocol Passed - 9/8/2023  1:37 AM        Passed - ALT in past 12 months        Passed - LDL in past 12 months        Passed - Last ALT < 80     Lab Results   Component Value Date    ALT 25 01/30/2023             Passed - Last LDL < 130     Lab Results   Component Value Date    LDL 84 01/30/2023             Passed - In person appointment or virtual visit in the past 12 mos or appointment in next 3 mos     Recent Outpatient Visits              3 weeks ago [address] [place] [address] [address]    Office Visit    1 month ago [facility]    [facility] Visit    2 months ago Primary hypertension    [address] [facility] Zuhair Sanchez MD    Office Visit    7 months ago Annual physical exam    [address], [address], Zuhair Sanchez MD    Office Visit    1 year ago Primary hypertension    [facility], [address], Chavo Fernando MD    Office Visit          Future Appointments         Provider Department Appt Notes    In 2 weeks [facility] SYSTEM OF THE [facility] 54 for Health order in epic, ag    In 2 months [facility], DO [address], [address], [place]  (Policy Informed)                  Future Appointments         Provider Department Appt Notes    In 2 weeks [facility] OF THE [facility] 54 for Health order in epic, ag    In 2 months [facility], DO [address], [place]  (Policy Informed)          Recent Outpatient Visits              3 weeks ago [facility]    Office Visit    1 month ago Primary hypertension    [address] Mulligan MD    Office Visit    2 months ago Primary hypertension    [address] Mull[address] MD    Office Visit    7 months ago Annual physical exam    [address] Mull[address] MD    Office Visit    1 year ago Primary hypertension    [address] Mulligan MD    Office Visit

## 2023-09-22 ENCOUNTER — HOSPITAL ENCOUNTER (OUTPATIENT)
Dept: MAMMOGRAPHY | Facility: HOSPITAL | Age: 73
Discharge: HOME | End: 2023-09-22
Attending: INTERNAL MEDICINE
Payer: MEDICARE

## 2023-09-22 DIAGNOSIS — R92.8 ABNORMAL MAMMOGRAM OF RIGHT BREAST: ICD-10-CM

## 2023-09-22 PROCEDURE — 77061 BREAST TOMOSYNTHESIS UNI: CPT | Performed by: INTERNAL MEDICINE

## 2023-09-22 PROCEDURE — 77065 DX MAMMO INCL CAD UNI: CPT | Performed by: INTERNAL MEDICINE

## 2023-09-26 NOTE — DISCHARGE INSTRUCTIONS
The Doctor (Radiologist) who performed your procedure was: DR Jason Jones an ice pack over the biopsy site on top of your bra or on top of the ACE wrap (never apply ice directly over skin) for 10-15 minutes of every hour until bedtime for your comfort and to decrease bleeding. Keep your sports bra or the ACE wrap (stereotactic and MRI biopsy) in place for 24 hours after your biopsy. This compression decreases bleeding and breast movement for your comfort. Wear a supportive bra for the next couple of days for comfort (sports bra for sleep). Continue to wear, preferably, a sports bra or good supportive bra for 1 week and take off only to shower. No baths or showers during the first 24 hours after biopsy. After this time you may take a shower. It's okay if the strips get wet but do not soak them. NO saunas, hot tubs or swimming until steri-strips fall off (approx. 5 days). This prevents infection and allows time for them to completely close and heal.  DO NOT remove the steri-strips. They will fall off in 5 days. If any type of irritation (redness, itching or blisters) develops in the area around the steri-strips, remove them gently. If the steri-strips do not fall off after 5 days, gently remove them. Keep the area clean and dry. It is normal to have mild discomfort and bruising at the biopsy site. You may take Tylenol as needed for discomfort, as long as you have no allergies to Tylenol. Do not take aspirin, motrin, ibuprofen or any medication containing NSAID (non-steroidal anti-inflammatory drug) product for 48 hours. DO NOT participate in strenuous activity (aerobics, heavy lifting, housework, gardening, etc.) 48 hours after your biopsy to prevent bleeding. You will receive results in 2-3 business days. If you are having an MRI breast biopsy or an Ultrasound guided breast biopsy, you will be billed for the biopsy and unilateral mammogram separately.   If you have any questions about the procedure or your results, please contact the Breast Care Coordinator Nurse at [phone]. Notify your ordering physician or primary physician for increased bleeding, pain or fever over 100. Or contact a Radiology Nurse at [phone] between 8am-4pm (after 4pm, your call will be directed to the [facility]).

## 2023-09-27 ENCOUNTER — HOSPITAL ENCOUNTER (OUTPATIENT)
Dept: MAMMOGRAPHY | Facility: HOSPITAL | Age: 73
Discharge: HOME | End: 2023-09-27
Attending: INTERNAL MEDICINE
Payer: MEDICARE

## 2023-09-27 DIAGNOSIS — R92.0 MICROCALCIFICATIONS OF THE BREAST: ICD-10-CM

## 2023-09-27 PROCEDURE — 19081 BX BREAST 1ST LESION STRTCTC: CPT | Performed by: INTERNAL MEDICINE

## 2023-09-27 PROCEDURE — 88305 TISSUE EXAM BY PATHOLOGIST: CPT | Performed by: INTERNAL MEDICINE

## 2023-09-27 NOTE — IMAGING NOTE
History taken and as follows: CONCLUSION:     Indeterminate microcalcifications in the inner anterior right breast have increased in conspicuity. Recommend further evaluation with tomosynthesis-guided core needle biopsy of the right breast     0953  Pt consented at  [address] Post instructions  Given verbal et written AVS  summary sheet provided to patient. Patient verbalizes understanding and agreement    Placed in chair  at  [facility]  scouts taken    1020  Dr Nathalie Christie  here to explain risk and benefits of procedure. Questions answered by the physcian    1020 Time out taken    1021 Chloro prep as skin prep. Lidocaine 1% 10  Milligrams per ml 5 ml given for superficial anesthetic affect at 1023     1023 Lidocaine  1% with epinephrine  1:100,000 units for deeper anesthetic affect 15 ML given. More images taken after local  was given. Sampling begins at 1025     Sampling complete at  1028 Core tainer taken to be imaged. 1029  Formalin added to samples. 1027    BUCKLE  clip inserted . Procedure completed . Pressure to site manually by nurse  and by machine compression for 10 minutes . No active bleeding noted. Area cleaned steri strips to site . Ice pack to site . 1050 Cores taken to pathology by [employer] TECH AIDE      1050 post clip images  completed  Dressing dry and intact . Nipple markers removed by Drippler TECH  Bra applied per patient. 6\" ace secured over bra by MAMMO     . PT TO BE discharged BY MAMMO  WHEN FINISHED

## 2023-09-28 ENCOUNTER — TELEPHONE (OUTPATIENT)
Dept: ULTRASOUND IMAGING | Facility: HOSPITAL | Age: 73
End: 2023-09-28

## 2023-09-28 ENCOUNTER — TELEPHONE (OUTPATIENT)
Dept: INTERNAL MEDICINE CLINIC | Facility: CLINIC | Age: 73
End: 2023-09-28

## 2023-09-28 NOTE — TELEPHONE ENCOUNTER
Follow up call no answer I left  a detail message to retrn call to [phone] to go over results and recommendations with her

## 2023-09-29 ENCOUNTER — TELEPHONE (OUTPATIENT)
Dept: ULTRASOUND IMAGING | Facility: HOSPITAL | Age: 73
End: 2023-09-29

## 2023-09-29 RX ORDER — HYDROCHLOROTHIAZIDE 12.5 MG/1
12.5 CAPSULE, GELATIN COATED ORAL DAILY
Qty: 90 CAPSULE | Refills: 3 | Status: SHIPPED | OUTPATIENT
Start: 2023-09-29

## 2023-09-29 NOTE — PROCEDURES
[facility]  Procedure Note    Alexandra Cobb Patient Status:  Outpatient    8/10/1950 MRN [id number]   Location [address] [facility] Attending No att. providers found   Hosp Day # 0 PCP Ezio Landa MD     Procedure: stereotactic guided biopsy of the right breast    Pre-Procedure Diagnosis:  right breast inner anterior calcifications    Post-Procedure Diagnosis: right breast inner anterior calcifications    Anesthesia:  Local    Findings:  right breast inner anterior calcifications    Specimens: [id number] [address] Jossie Lowry,   2023

## 2023-09-29 NOTE — TELEPHONE ENCOUNTER
Yareli Butterfield is s/p biopsy . Phoned and introduced myself as breast coordinator . Reinforced to patient  post biopsy care and instructions . No c/ o post procedure    Informed  and shared the pathology results as well as the recommendations from Dr Sylvie Lopez for her breast imaging  as follows:      Pathology results shared (see epic for dictated pathology and radiology procedure report)  and recommendations are as follows:       RECOMMENDATION:        Pathology results are benign and concordant. Patient may return to annual screening mammogram.                Jodee Ewingacknowledges the above and denies questions. Yareli Butterfield was also instructed to perform breast self exams and if any changes  develops any changes to contact ordering  physician immediately  for re evaluation. Vannesa Ewingverbalizes understanding and agreement.

## 2023-09-29 NOTE — TELEPHONE ENCOUNTER
Refill passed per [employer], [facility] protocol. Requested Prescriptions   Pending Prescriptions Disp Refills    hydroCHLOROthiazide 12.5 MG Oral Cap 90 capsule 0     Sig: Take 1 capsule (12.5 mg total) by mouth daily. Hypertensive Medications Protocol Passed - 9/28/2023 12:34 PM        Passed - In person appointment in the past 12 or next 3 months     Recent Outpatient Visits              1 month ago [address]    Office Visit    1 month ago Primary hypertension    [address], [address], Dary Hahn MD    Office Visit    2 months ago Primary hypertension    Dary Godoy MD    Office Visit    8 months ago Annual physical exam    Daniel Godoy Austin, MD    Office Visit    1 year ago Primary hypertension    [facility], [address], Dary Hahn MD    Office Visit          Future Appointments         Provider Department Appt Notes    In 1 month Jeremy Jeans, DO [facility], [address], [place]  (Policy Informed)                    Passed - Last BP reading less than 140/90     BP Readings from Last 1 Encounters:  08/09/23 : 95/64              Passed - CMP or BMP in past 6 months     Recent Results (from the past 4392 hour(s))   Basic Metabolic Panel (8) [E]    Collection Time: 08/09/23  1:59 PM   Result Value Ref Range    Glucose 90 70 - 99 mg/dL    Sodium 142 136 - 145 mmol/L    Potassium 3.7 3.5 - 5.1 mmol/L    Chloride 106 98 - 112 mmol/L    CO2 31.0 21.0 - 32.0 mmol/L    Anion Gap 5 0 - 18 mmol/L    BUN 17 7 - 18 mg/dL    Creatinine 1.09 (H) 0.55 - 1.02 mg/dL    BUN/CREA Ratio 15.6 10.0 - 20.0    Calcium, Total 8.9 8.5 - 10.1 mg/dL    Calculated Osmolality 295 275 - 295 mOsm/kg    eGFR-Cr 53 (L) >=60 mL/min/1.73m2    Patient Fasting for BMP?  No      *Note: Due to a large number of results and/or encounters for the requested time period, some results have not been displayed. A complete set of results can be found in Results Review.                Passed - In person appointment or virtual visit in the past 6 months     Recent Outpatient Visits              1 month ago [address] [place] [address] [address]    Office Visit    1 month ago Primary hypertension    [address] Bob Sethi MD    Office Visit    2 months ago Primary hypertension    [address], Bob Drake MD    Office Visit    8 months ago Annual physical exam    [address] [facility], Zuhair Sanchez MD    Office Visit    1 year ago Primary hypertension    [address], Bob Drake MD    Office Visit          Future Appointments         Provider Department Appt Notes    In 1 month Fortunato Bass, DO [facility], [address], [place]  (Policy Informed)                    Passed - EGFRCR or GFRNAA > 50     GFR Evaluation  EGFRCR: 53 , resulted on 8/9/2023             Recent Outpatient Visits              1 month ago [address] [place] [address] [address]    Office Visit    1 month ago Primary hypertension    [address] Bob Sethi MD    Office Visit    2 months ago Primary hypertension    [address] Bob Sethi MD    Office Visit    8 months ago Annual physical exam    [address] [facility], Zuhair Sanchez MD    Office Visit    1 year ago Primary hypertension    [address] Bob Sethi MD    Office Visit          Future Appointments         Provider Department Appt Notes    In 1 month [facility] DO [facility], [address], [place]  (Policy Informed)

## 2023-10-02 ENCOUNTER — TELEPHONE (OUTPATIENT)
Dept: ULTRASOUND IMAGING | Facility: HOSPITAL | Age: 73
End: 2023-10-02

## 2023-10-02 NOTE — TELEPHONE ENCOUNTER
I left a detail message to call me back at [phone] with any questions or concerns post Bx. I noticed I did call her [address] [place] [address] Avenue did well post bx.

## 2023-10-19 ENCOUNTER — IMMUNIZATION (OUTPATIENT)
Dept: LAB | Age: 73
End: 2023-10-19
Attending: EMERGENCY MEDICINE
Payer: MEDICARE

## 2023-10-19 DIAGNOSIS — Z23 NEED FOR VACCINATION: Primary | ICD-10-CM

## 2023-10-19 PROCEDURE — 90471 IMMUNIZATION ADMIN: CPT

## 2023-10-19 PROCEDURE — 90662 IIV NO PRSV INCREASED AG IM: CPT

## 2023-11-10 ENCOUNTER — OFFICE VISIT (OUTPATIENT)
Dept: PULMONOLOGY | Facility: CLINIC | Age: 73
End: 2023-11-10
Payer: MEDICARE

## 2023-11-10 VITALS
HEART RATE: 87 BPM | HEIGHT: 62 IN | OXYGEN SATURATION: 96 % | BODY MASS INDEX: 37.36 KG/M2 | DIASTOLIC BLOOD PRESSURE: 65 MMHG | SYSTOLIC BLOOD PRESSURE: 94 MMHG | WEIGHT: 203 LBS

## 2023-11-10 DIAGNOSIS — G47.33 OSA (OBSTRUCTIVE SLEEP APNEA): Primary | ICD-10-CM

## 2023-11-10 PROCEDURE — 99204 OFFICE O/P NEW MOD 45 MIN: CPT | Performed by: INTERNAL MEDICINE

## 2023-11-10 PROCEDURE — 1126F AMNT PAIN NOTED NONE PRSNT: CPT | Performed by: INTERNAL MEDICINE

## 2023-11-10 NOTE — H&P
Referring Physician  Odin Cohn MD    Chief Complaint  Sleep apnea    History of Present Illness  Patient is a 66-year-old female who presents to pulmonary clinic for initial visit. Admits to some snoring with recent sleep apnea diagnosis after polysomnography performed. Some mild hypersomnia and fatigue throughout the day. Has not been set up with CPAP device.   Denies history of known lung disease or significant dyspnea symptoms    Review of Systems  Constitutional: denies weight loss, fevers, chills, weakness,   HEENT: denies epistaxis, sore throat, postnasal drip  Cardio: denies chest pain, chest pressure, palpitations  Respiratory: denies dyspnea, cough, wheezing, hemoptysis   GI: denies nausea, vomiting, abdominal pain  : denies dysuria, hematuria  Musculoskeletal: denies arthralgia, myalgia  Integumentary: denies rash, itching  Neurological: denies syncope, weakness, dizziness,   Psychiatric: denies depression, anxiety  Hematologic: denies bruising    Past Medical History  Past Medical History:   Diagnosis Date    Basal cell carcinoma 2003    Basal cell carcinoma 2002    High blood pressure     High cholesterol     Osteoarthritis     Other and unspecified hyperlipidemia     Unspecified essential hypertension     Visual impairment     glasses        Surgical History  Past Surgical History:   Procedure Laterality Date    COLONOSCOPY  10/2015    COLONOSCOPY  10/2020    COLONOSCOPY N/A 10/28/2020    Procedure: COLONOSCOPY;  Surgeon: Farzad Daniel MD;  Location: [address] [place] [address] ENDOSCOPY    DILATION/CURETTAGE,DIAGNOSTIC      DEVANG BIOPSY STEREO W/CALC 1 SITE RIGHT (CPT=19081)  09/27/2023    buckle clip    OTHER      Excision of basal cell carcinoma with skin graft     OTHER Left 05/2005    wrist fx surgery    OTHER SURGICAL HISTORY  12/12/2017    left knee torn meniscus    TONSILLECTOMY         Family History  Family History   Problem Relation Age of Onset    Basal Cell Self     Ovarian Cancer Mother 80        Cause of death     Heart Disease Father 61        Cause of death    No Known Problems Sister     No Known Problems Daughter     No Known Problems Maternal Grandmother     No Known Problems Paternal Grandmother     Cancer Paternal Grandfather         Stomach     No Known Problems Maternal Aunt     No Known Problems Paternal Aunt     No Known Problems Maternal Cousin Female     No Known Problems Maternal Cousin Male     No Known Problems Paternal Cousin Female     No Known Problems Paternal Cousin Male     Heart Disease Other     Breast Cancer Neg     DCIS Neg     LCIS Neg     BRCA gene + Neg     Ashkenazi Yazdanism Descent Neg         Social History  Tobacco: Denies  Alcohol: Denies significant intake  Illicit Drugs: Denies    Medications  Current Outpatient Medications on File Prior to Visit   Medication Sig Dispense Refill    hydroCHLOROthiazide 12.5 MG Oral Cap Take 1 capsule (12.5 mg total) by mouth daily. 90 capsule 3    simvastatin 20 MG Oral Tab Take 1 tablet (20 mg total) by mouth nightly. 90 tablet 3    losartan 100 MG Oral Tab Take 1 tablet (100 mg total) by mouth daily. 90 tablet 3    Cholecalciferol (VITAMIN D) 50 MCG (2000 UT) Oral Cap Take by mouth. Tobramycin Sulfate 0.3 % Ophthalmic Solution INSTILL ONE DROP IN AFFECTED EYE FOUR TIMES DAILY      multiple vitamin Oral Chew Tab Chew 1 tablet by mouth daily. No current facility-administered medications on file prior to visit.        Allergies  Allergies   Allergen Reactions    Lisinopril ANGIOEDEMA, FACE FLUSHING and SWELLING    Naproxen HIVES     Other reaction(s): NAPROXEN       Physical Exam  Constitutional: no acute distress  HEENT: PERRL  Neck: supple, no JVD  Cardio: RRR, S1 S2  Respiratory: clear to auscultation bilaterally, no wheezing, rales, rhonchi, crackles  GI: abdomen soft, non tender, active bowel sounds, no organomegaly  Extremities: no clubbing, cyanosis, edema  Neurologic: no gross motor deficits  Skin: warm, dry  Lymphatic: no supraclavicular lymphadenopathy     Assessment  1. [place]  2. Obesity    Plan  -Patient presents today for management of underlying sleep apnea. I reviewed her sleep study results with evidence of borderline severe sleep apnea. Encouraged weight loss. Placed order for auto CPAP.   Advised patient to return to pulm clinic between 30 and 90 days of being set up with device to review efficacy and compliance    Jonathan Waller DO  Pulmonary Medicine  [facility], [facility]  11/10/2023  11:43 AM

## 2023-11-13 ENCOUNTER — IMMUNIZATION (OUTPATIENT)
Dept: LAB | Age: 73
End: 2023-11-13
Attending: EMERGENCY MEDICINE
Payer: MEDICARE

## 2023-11-13 DIAGNOSIS — Z23 NEED FOR VACCINATION: Primary | ICD-10-CM

## 2023-11-13 PROCEDURE — 90480 ADMN SARSCOV2 VAC 1/ONLY CMP: CPT

## 2023-12-05 ENCOUNTER — TELEPHONE (OUTPATIENT)
Dept: INTERNAL MEDICINE CLINIC | Facility: CLINIC | Age: 73
End: 2023-12-05

## 2023-12-05 NOTE — TELEPHONE ENCOUNTER
Alexus Estrada from [facility]. Susan 82 she needs face to face notes prior to the baseline. Please fax to [phone].  Please advise

## 2023-12-12 ENCOUNTER — TELEPHONE (OUTPATIENT)
Dept: PULMONOLOGY | Facility: CLINIC | Age: 73
End: 2023-12-12

## 2023-12-12 NOTE — TELEPHONE ENCOUNTER
Received fax from [address] [place] [address] stating that pt needs appt for 1/11/24 to 3/10/24. Patient has appt scheduled for 1/26/24.

## 2023-12-14 NOTE — TELEPHONE ENCOUNTER
Order signed by Dr. Lyn and faxed back to [facility] with confirmation. Copy placed in the E folder for  and order sent for scanning.

## 2024-02-12 ENCOUNTER — OFFICE VISIT (OUTPATIENT)
Dept: INTERNAL MEDICINE CLINIC | Facility: CLINIC | Age: 74
End: 2024-02-12

## 2024-02-12 VITALS
HEIGHT: 62 IN | TEMPERATURE: 98 F | HEART RATE: 70 BPM | BODY MASS INDEX: 38.64 KG/M2 | OXYGEN SATURATION: 97 % | WEIGHT: 210 LBS | DIASTOLIC BLOOD PRESSURE: 69 MMHG | SYSTOLIC BLOOD PRESSURE: 107 MMHG

## 2024-02-12 DIAGNOSIS — Z12.31 ENCOUNTER FOR SCREENING MAMMOGRAM FOR MALIGNANT NEOPLASM OF BREAST: ICD-10-CM

## 2024-02-12 DIAGNOSIS — Z00.00 ANNUAL PHYSICAL EXAM: ICD-10-CM

## 2024-02-12 DIAGNOSIS — E53.8 B12 DEFICIENCY: ICD-10-CM

## 2024-02-12 DIAGNOSIS — R48.2 GAIT APRAXIA: ICD-10-CM

## 2024-02-12 DIAGNOSIS — Z00.00 ENCOUNTER FOR ANNUAL HEALTH EXAMINATION: Primary | ICD-10-CM

## 2024-02-12 PROBLEM — G47.33 OSA ON CPAP: Status: ACTIVE | Noted: 2024-02-12

## 2024-02-12 NOTE — PROGRESS NOTES
Subjective:   Verito Ewing is a 73 year old female who presents for a Medicare Subsequent Annual Wellness visit (Pt already had Initial Annual Wellness) and scheduled follow up of multiple significant but stable problems.       History/Other:   Fall Risk Assessment:   She has been screened for Falls and is High Risk. Fall Prevention information provided to patient in After Visit Summary.     She states that lately it happened couple of times while she is walking all of a sudden she freezes she stops she can make step forward, unless she is somebody around her to help her    Cognitive Assessment:   She had a completely normal cognitive assessment - see flowsheet entries     Functional Ability/Status:   Verito Ewing has some abnormal functions as listed below:  She has Hearing problems based on screening of functional status.She has Vision problems based on screening of functional status.       Depression Screening (PHQ-2/PHQ-9): PHQ-2 SCORE: 0  , done 2/12/2024   Last [facility] Suicide Screening on 2/12/2024 was No Risk.     5 minutes spent screening and counseling for depression    Advanced Directives:   She does NOT have a Living Will. [ ]  She does have a POA but we do NOT have it on file in Epic.    Discussed Advance Care Planning with patient (and family/surrogate if present). Standard forms made available to patient in After Visit Summary.      Patient Active Problem List   Diagnosis    Hypertension, benign    Hypercholesterolemia    Personal history of malignant neoplasm of skin    Seborrheic keratosis    Hearing loss    Colon polyp    Iron deficiency anemia    Sensorineural hearing loss, bilateral    Essential hypertension     Allergies:  She is allergic to lisinopril and naproxen.    Current Medications:  Outpatient Medications Marked as Taking for the 2/12/24 encounter (Office Visit) with Jody Zavala MD   Medication Sig    hydroCHLOROthiazide 12.5 MG Oral Cap Take 1 capsule (12.5 mg total) by mouth  daily.    simvastatin 20 MG Oral Tab Take 1 tablet (20 mg total) by mouth nightly.    losartan 100 MG Oral Tab Take 1 tablet (100 mg total) by mouth daily.    Cholecalciferol (VITAMIN D) 50 MCG (2000 UT) Oral Cap Take by mouth.    Tobramycin Sulfate 0.3 % Ophthalmic Solution INSTILL ONE DROP IN AFFECTED EYE FOUR TIMES DAILY    multiple vitamin Oral Chew Tab Chew 1 tablet by mouth daily.       Medical History:  She  has a past medical history of Basal cell carcinoma (2003), Basal cell carcinoma (2002), High blood pressure, High cholesterol, Osteoarthritis, Other and unspecified hyperlipidemia, Unspecified essential hypertension, and Visual impairment.  Surgical History:  She  has a past surgical history that includes other; tonsillectomy; dilation/curettage,diagnostic; other (Left, 05/2005); other surgical history (12/12/2017); colonoscopy (10/2015); colonoscopy (10/2020); colonoscopy (N/A, 10/28/2020); and kavin biopsy stereo w/calc 1 site right (cpt=19081) (09/27/2023).   Family History:  Her family history includes Basal Cell in her self; Cancer in her paternal grandfather; Heart Disease in an other family member; Heart Disease (age of onset: 59) in her father; No Known Problems in her daughter, maternal aunt, maternal cousin female, maternal cousin male, maternal grandmother, paternal aunt, paternal cousin female, paternal cousin male, paternal grandmother, and sister; Ovarian Cancer (age of onset: 84) in her mother.  Social History:  She  reports that she has never smoked. She has never used smokeless tobacco. She reports that she does not drink alcohol and does not use drugs.    Tobacco:  She has never smoked tobacco.    CAGE Alcohol Screen:   CAGE screening score of 0 on 2/6/2024, showing low risk of alcohol abuse.      Patient Care Team:  Jody Zavala MD as PCP - General (Internal Medicine)    Review of Systems  GENERAL: feels well otherwise  SKIN: denies any unusual skin lesions  EYES: denies blurred vision  or double vision  HEENT: denies nasal congestion, sinus pain or ST  LUNGS: denies shortness of breath with exertion  CARDIOVASCULAR: denies chest pain on exertion  GI: denies abdominal pain, denies heartburn  : denies dysuria, vaginal discharge or itching, no complaint of urinary incontinence   MUSCULOSKELETAL: denies back pain  NEURO: denies headaches  PSYCHE: denies depression or anxiety  HEMATOLOGIC: denies hx of anemia  ENDOCRINE: denies thyroid history  ALL/ASTHMA: denies hx of allergy or asthma    Objective:   Physical Exam  General Appearance:  Alert, cooperative, no distress, appears stated age   Head:  Normocephalic, without obvious abnormality, atraumatic   Eyes:  PERRL, conjunctiva/corneas clear, EOM's intact both eyes   Ears:  Normal TM's and external ear canals, both ears   Nose: Nares normal, septum midline,mucosa normal, no drainage or sinus tenderness   Throat: Lips, mucosa, and tongue normal; teeth and gums normal   Neck: Supple, symmetrical, trachea midline, no adenopathy;  thyroid: not enlarged, symmetric, no tenderness/mass/nodules; no carotid bruit or JVD   Back:   Symmetric, no curvature, ROM normal, no CVA tenderness   Lungs:   Clear to auscultation bilaterally, respirations unlabored   Heart:  Regular rate and rhythm, S1 and S2 normal, no murmur, rub, or gallop   Abdomen:   Soft, non-tender, bowel sounds active all four quadrants,  no masses, no organomegaly   Pelvic: Deferred   Extremities: Extremities normal, atraumatic, no cyanosis or edema   Pulses: 2+ and symmetric   Skin: Skin color, texture, turgor normal, no rashes or lesions   Lymph nodes: Cervical, supraclavicular, and axillary nodes normal   Neurologic: Normal       /69 (BP Location: Left arm, Patient Position: Sitting, Cuff Size: adult)   Pulse 70   Temp 97.6 °F (36.4 °C) (Temporal)   Ht 5' 2\" (1.575 m)   Wt 210 lb (95.3 kg)   SpO2 97%   BMI 38.41 kg/m²  Estimated body mass index is 38.41 kg/m² as calculated from the  following:    Height as of this encounter: 5' 2\" (1.575 m).    Weight as of this encounter: 210 lb (95.3 kg).    Medicare Hearing Assessment:   Hearing Screening    Screening Method: Questionnaire  I have a problem hearing over the telephone: Sometimes I have trouble following the conversations when two or more people are talking at the same time: Sometimes   I have trouble understanding things on the TV: Yes I have to strain to understand conversations: Sometimes   I have to worry about missing the telephone ring or doorbell: No I have trouble hearing conversations in a noisy background such as a crowded room or restaurant: Sometimes   I get confused about where sounds come from: No I misunderstand some words in a sentence and need to ask people to repeat themselves: Yes   I especially have trouble understanding the speech of women and children: No I have trouble understanding the speaker in a large room such as at a meeting or place of [employer]: Sometimes   Many people I talk to seem to mumble (or don't speak clearly): Sometimes People get annoyed because I misunderstand what they say: No   I misunderstand what others are saying and make inappropriate responses: No I avoid social activities because I cannot hear well and fear I will reply improperly: No   Family members and friends have told me they think I may have hearing loss: Yes             Visual Acuity:     Right Eye Chart Acuity: 20/40     Left Eye Chart Acuity: 20/40     Both Eyes Chart Acuity: 20/40            Assessment & Plan:   Verito Ewing is a 73 year old female who presents for a Medicare Assessment.      [place] on CPAP-continue with CPAP    Adenomatous polyp of ascending colon-  stable       Encounter for screening mammogram for breast cancer  -     [facility] SCREENING BILAT (CPT=77067); Future        Hypertension -stable    Hypercholesterolemia -stable ,we  will check lipid panel        Sensorineural hearing loss,bilateral- hearing aids    Iron  deficiency anemia -we will check  iron panel    There are no diagnoses linked to this encounter.  The patient indicates understanding of these issues and agrees to the plan.  Reinforced healthy diet, lifestyle, and exercise.      No follow-ups on file.     SHAYNA DONALDSON MD, 2/12/2024     Supplementary Documentation:   General Health:          Verito Ewing's SCREENING SCHEDULE   Tests on this list are recommended by your physician but may not be covered, or covered at this frequency, by your insurer.   Please check with your insurance carrier before scheduling to verify coverage.   PREVENTATIVE SERVICES FREQUENCY &  COVERAGE DETAILS LAST COMPLETION DATE   Diabetes Screening    Fasting Blood Sugar /  Glucose    One screening every 12 months if never tested or if previously tested but not diagnosed with pre-diabetes   One screening every 6 months if diagnosed with pre-diabetes Lab Results   Component Value Date    GLU 90 08/09/2023        Cardiovascular Disease Screening    Lipid Panel  Cholesterol  Lipoprotein (HDL)  Triglycerides Covered every 5 years for all Medicare beneficiaries without apparent signs or symptoms of cardiovascular disease Lab Results   Component Value Date    CHOLEST 162 01/30/2023    HDL 46 01/30/2023    LDL 84 01/30/2023    TRIG 189 (H) 01/30/2023         Electrocardiogram (EKG)   Covered if needed at Welcome to Medicare, and non-screening if indicated for medical reasons 07/02/2023      Ultrasound Screening for Abdominal Aortic Aneurysm (AAA) Covered once in a lifetime for one of the following risk factors    Men who are 65-75 years old and have ever smoked    Anyone with a family history -     Colorectal Cancer Screening  Covered for ages 50-85; only need ONE of the following:    Colonoscopy   Covered every 10 years    Covered every 2 years if patient is at high risk or previous colonoscopy was abnormal 10/28/2020    Health Maintenance   Topic Date Due    Colorectal Cancer Screening   10/28/2030       Flexible Sigmoidoscopy   Covered every 4 years -    Fecal Occult Blood Test Covered annually -   Bone Density Screening    Bone density screening    Covered every 2 years after age 65 if diagnosed with risk of osteoporosis or estrogen deficiency.    Covered yearly for long-term glucocorticoid medication use (Steroids) Last Dexa Scan:    XR DEXA BONE DENSITOMETRY (CPT=77080) 11/21/2017      No recommendations at this time   Pap and Pelvic    Pap   Covered every 2 years for women at normal risk; Annually if at high risk -  No recommendations at this time    Chlamydia Annually if high risk -  No recommendations at this time   Screening Mammogram    Mammogram     Recommend annually for all female patients aged 40 and older    One baseline mammogram covered for patients aged 35-39 09/22/2023    Health Maintenance   Topic Date Due    Mammogram  Discontinued       Immunizations    Influenza Covered once per flu season  Please get every year 10/19/2023  No recommendations at this time    Pneumococcal Each vaccine ([id number] & [id number]) covered once after 65 Prevnar 13: 02/25/2015    [id number]: 10/13/2017     No recommendations at this time    Hepatitis B One screening covered for patients with certain risk factors   -  No recommendations at this time    Tetanus Toxoid Not covered by Medicare Part B unless medically necessary (cut with metal); may be covered with your pharmacy prescription benefits -    Tetanus, Diptheria and Pertusis TD and TDaP Not covered by Medicare Part B -  No recommendations at this time    Zoster Not covered by Medicare Part B; may be covered with your pharmacy  prescription benefits -  Zoster Vaccines(1 of 2) Never done     Annual Monitoring of Persistent Medications (ACE/ARB, digoxin diuretics, anticonvulsants)    Potassium Annually Lab Results   Component Value Date    K 3.7 08/09/2023         Creatinine   Annually Lab Results   Component Value Date    CREATSERUM 1.09 (H)  08/09/2023         BUN Annually Lab Results   Component Value Date    BUN 17 08/09/2023       Drug Serum Conc Annually No results found for: \"DIGOXIN\", \"DIG\", \"VALP\"

## 2024-02-12 NOTE — PATIENT INSTRUCTIONS
Verito Ewing's SCREENING SCHEDULE   Tests on this list are recommended by your physician but may not be covered, or covered at this frequency, by your insurer.   Please check with your insurance carrier before scheduling to verify coverage.   PREVENTATIVE SERVICES FREQUENCY &  COVERAGE DETAILS LAST COMPLETION DATE   Diabetes Screening    Fasting Blood Sugar /  Glucose    One screening every 12 months if never tested or if previously tested but not diagnosed with pre-diabetes   One screening every 6 months if diagnosed with pre-diabetes Lab Results   Component Value Date    GLU 90 08/09/2023        Cardiovascular Disease Screening    Lipid Panel  Cholesterol  Lipoprotein (HDL)  Triglycerides Covered every 5 years for all Medicare beneficiaries without apparent signs or symptoms of cardiovascular disease Lab Results   Component Value Date    CHOLEST 162 01/30/2023    HDL 46 01/30/2023    LDL 84 01/30/2023    TRIG 189 (H) 01/30/2023         Electrocardiogram (EKG)   Covered if needed at Welcome to Medicare, and non-screening if indicated for medical reasons 07/02/2023      Ultrasound Screening for Abdominal Aortic Aneurysm (AAA) Covered once in a lifetime for one of the following risk factors   • Men who are 65-75 years old and have ever smoked   • Anyone with a family history -     Colorectal Cancer Screening  Covered for ages 50-85; only need ONE of the following:    Colonoscopy   Covered every 10 years    Covered every 2 years if patient is at high risk or previous colonoscopy was abnormal 10/28/2020    Health Maintenance   Topic Date Due   • Colorectal Cancer Screening  10/28/2030       Flexible Sigmoidoscopy   Covered every 4 years -    Fecal Occult Blood Test Covered annually -   Bone Density Screening    Bone density screening    Covered every 2 years after age 65 if diagnosed with risk of osteoporosis or estrogen deficiency.    Covered yearly for long-term glucocorticoid medication use (Steroids) Last Dexa  Scan:    XR DEXA BONE DENSITOMETRY (CPT=77080) 11/21/2017      No recommendations at this time   Pap and Pelvic    Pap   Covered every 2 years for women at normal risk; Annually if at high risk -  No recommendations at this time    Chlamydia Annually if high risk -  No recommendations at this time   Screening Mammogram    Mammogram     Recommend annually for all female patients aged 40 and older    One baseline mammogram covered for patients aged 35-39 09/22/2023    Health Maintenance   Topic Date Due   • Mammogram  Discontinued       Immunizations    Influenza Covered once per flu season  Please get every year 10/19/2023  No recommendations at this time    Pneumococcal Each vaccine (Xspntie85 & Vtfzqoner30) covered once after 65 Prevnar 13: 02/25/2015    Mtwfztedl23: 10/13/2017     No recommendations at this time    Hepatitis B One screening covered for patients with certain risk factors   -  No recommendations at this time    Tetanus Toxoid Not covered by Medicare Part B unless medically necessary (cut with metal); may be covered with your pharmacy prescription benefits -    Tetanus, Diptheria and Pertusis TD and TDaP Not covered by Medicare Part B -  No recommendations at this time    Zoster Not covered by Medicare Part B; may be covered with your pharmacy  prescription benefits -  Zoster Vaccines(1 of 2) Never done     Annual Monitoring of Persistent Medications (ACE/ARB, digoxin diuretics, anticonvulsants)    Potassium Annually Lab Results   Component Value Date    K 3.7 08/09/2023         Creatinine   Annually Lab Results   Component Value Date    CREATSERUM 1.09 (H) 08/09/2023         BUN Annually Lab Results   Component Value Date    BUN 17 08/09/2023       Drug Serum Conc Annually No results found for: \"DIGOXIN\", \"DIG\", \"VALP\"

## 2024-02-15 ENCOUNTER — TELEPHONE (OUTPATIENT)
Dept: PULMONOLOGY | Facility: CLINIC | Age: 74
End: 2024-02-15

## 2024-02-15 NOTE — TELEPHONE ENCOUNTER
Spoke with patient. Patient states she has been using CPAP device for 68-69 days. Informed patient okay to keep appointment tomorrow and pulmonary office is able to obtain download data remotely. Patient verbalized understanding.    202

## 2024-02-15 NOTE — TELEPHONE ENCOUNTER
Pt has f/u appt for tomorrow - has had CPAP for 69 days - asking if that is enough time to come in - what does she need to bring with her to appt

## 2024-02-16 ENCOUNTER — OFFICE VISIT (OUTPATIENT)
Dept: PULMONOLOGY | Facility: CLINIC | Age: 74
End: 2024-02-16
Payer: MEDICARE

## 2024-02-16 VITALS
OXYGEN SATURATION: 96 % | BODY MASS INDEX: 38.9 KG/M2 | WEIGHT: 211.38 LBS | HEIGHT: 62 IN | HEART RATE: 73 BPM | DIASTOLIC BLOOD PRESSURE: 62 MMHG | SYSTOLIC BLOOD PRESSURE: 101 MMHG

## 2024-02-16 DIAGNOSIS — G47.33 OSA (OBSTRUCTIVE SLEEP APNEA): Primary | ICD-10-CM

## 2024-02-16 PROCEDURE — 99213 OFFICE O/P EST LOW 20 MIN: CPT | Performed by: INTERNAL MEDICINE

## 2024-02-16 NOTE — PROGRESS NOTES
Faxed over chart notes for compliance report to [facility] at #[phone] with confirmation received.

## 2024-02-16 NOTE — PROGRESS NOTES
Referring Physician  SHAYNA DONALDSON MD    History of Present Illness  Patient presents today for follow-up visit to pulm clinic.  Using CPAP device on nightly basis with improvement in hypersomnia and fatigue.  Tolerates device well.  Denies significant dyspnea symptoms.    Medications  Current Outpatient Medications on File Prior to Visit   Medication Sig Dispense Refill    hydroCHLOROthiazide 12.5 MG Oral Cap Take 1 capsule (12.5 mg total) by mouth daily. 90 capsule 3    simvastatin 20 MG Oral Tab Take 1 tablet (20 mg total) by mouth nightly. 90 tablet 3    losartan 100 MG Oral Tab Take 1 tablet (100 mg total) by mouth daily. 90 tablet 3    Cholecalciferol (VITAMIN D) 50 MCG (2000 UT) Oral Cap Take by mouth.      Tobramycin Sulfate 0.3 % Ophthalmic Solution INSTILL ONE DROP IN AFFECTED EYE FOUR TIMES DAILY      multiple vitamin Oral Chew Tab Chew 1 tablet by mouth daily.       No current facility-administered medications on file prior to visit.       Allergies  Allergies   Allergen Reactions    Lisinopril ANGIOEDEMA, FACE FLUSHING and SWELLING    Naproxen HIVES     Other reaction(s): NAPROXEN       Physical Exam  Constitutional: no acute distress  HEENT: PERRL  Neck: supple, no JVD  Cardio: RRR, S1 S2  Respiratory: clear to auscultation bilaterally, no wheezing, rales, rhonchi, crackles  GI: abdomen soft, non tender, active bowel sounds, no organomegaly  Extremities: no clubbing, cyanosis, edema  Neurologic: no gross motor deficits  Skin: warm, dry  Lymphatic: no supraclavicular lymphadenopathy     Assessment  1.  [place]    Plan  -Presents today for follow-up visit to pulmonary clinic.  Using CPAP device on nightly basis.  I reviewed download data over last 30 days with usage 100% of days.  Average usage 6 hours and 30 minutes.  AHI 0.6.  Advised patient to continue using CPAP device since she is benefiting from it    Kenan Lyn DO  Pulmonary Critical Care Medicine  [facility]  2/16/2024  11:20 AM

## 2024-02-26 ENCOUNTER — LAB ENCOUNTER (OUTPATIENT)
Dept: LAB | Facility: HOSPITAL | Age: 74
End: 2024-02-26
Attending: INTERNAL MEDICINE
Payer: MEDICARE

## 2024-02-26 ENCOUNTER — PATIENT MESSAGE (OUTPATIENT)
Dept: INTERNAL MEDICINE CLINIC | Facility: CLINIC | Age: 74
End: 2024-02-26

## 2024-02-26 DIAGNOSIS — Z00.00 ANNUAL PHYSICAL EXAM: ICD-10-CM

## 2024-02-26 DIAGNOSIS — E53.8 B12 DEFICIENCY: ICD-10-CM

## 2024-02-26 LAB
ALBUMIN SERPL-MCNC: 4.3 G/DL (ref 3.2–4.8)
ALBUMIN/GLOB SERPL: 1.8 {RATIO} (ref 1–2)
ALP LIVER SERPL-CCNC: 95 U/L
ALT SERPL-CCNC: 14 U/L
ANION GAP SERPL CALC-SCNC: 1 MMOL/L (ref 0–18)
AST SERPL-CCNC: 25 U/L (ref ?–34)
BASOPHILS # BLD AUTO: 0.03 X10(3) UL (ref 0–0.2)
BASOPHILS NFR BLD AUTO: 0.5 %
BILIRUB SERPL-MCNC: 0.5 MG/DL (ref 0.2–1.1)
BUN BLD-MCNC: 17 MG/DL (ref 9–23)
BUN/CREAT SERPL: 17 (ref 10–20)
CALCIUM BLD-MCNC: 9 MG/DL (ref 8.7–10.4)
CHLORIDE SERPL-SCNC: 110 MMOL/L (ref 98–112)
CHOLEST SERPL-MCNC: 169 MG/DL (ref ?–200)
CO2 SERPL-SCNC: 30 MMOL/L (ref 21–32)
CREAT BLD-MCNC: 1 MG/DL
DEPRECATED RDW RBC AUTO: 40.5 FL (ref 35.1–46.3)
EGFRCR SERPLBLD CKD-EPI 2021: 59 ML/MIN/1.73M2 (ref 60–?)
EOSINOPHIL # BLD AUTO: 0.11 X10(3) UL (ref 0–0.7)
EOSINOPHIL NFR BLD AUTO: 2 %
ERYTHROCYTE [DISTWIDTH] IN BLOOD BY AUTOMATED COUNT: 12.8 % (ref 11–15)
FASTING PATIENT LIPID ANSWER: YES
FASTING STATUS PATIENT QL REPORTED: YES
GLOBULIN PLAS-MCNC: 2.4 G/DL (ref 2.8–4.4)
GLUCOSE BLD-MCNC: 102 MG/DL (ref 70–99)
HCT VFR BLD AUTO: 37.1 %
HDLC SERPL-MCNC: 43 MG/DL (ref 40–59)
HGB BLD-MCNC: 11.7 G/DL
IMM GRANULOCYTES # BLD AUTO: 0.01 X10(3) UL (ref 0–1)
IMM GRANULOCYTES NFR BLD: 0.2 %
LDLC SERPL CALC-MCNC: 100 MG/DL (ref ?–100)
LYMPHOCYTES # BLD AUTO: 2.02 X10(3) UL (ref 1–4)
LYMPHOCYTES NFR BLD AUTO: 36.7 %
MCH RBC QN AUTO: 27.1 PG (ref 26–34)
MCHC RBC AUTO-ENTMCNC: 31.5 G/DL (ref 31–37)
MCV RBC AUTO: 85.9 FL
MONOCYTES # BLD AUTO: 0.5 X10(3) UL (ref 0.1–1)
MONOCYTES NFR BLD AUTO: 9.1 %
NEUTROPHILS # BLD AUTO: 2.84 X10 (3) UL (ref 1.5–7.7)
NEUTROPHILS # BLD AUTO: 2.84 X10(3) UL (ref 1.5–7.7)
NEUTROPHILS NFR BLD AUTO: 51.5 %
NONHDLC SERPL-MCNC: 126 MG/DL (ref ?–130)
OSMOLALITY SERPL CALC.SUM OF ELEC: 294 MOSM/KG (ref 275–295)
PLATELET # BLD AUTO: 237 10(3)UL (ref 150–450)
POTASSIUM SERPL-SCNC: 3.8 MMOL/L (ref 3.5–5.1)
PROT SERPL-MCNC: 6.7 G/DL (ref 5.7–8.2)
RBC # BLD AUTO: 4.32 X10(6)UL
SODIUM SERPL-SCNC: 141 MMOL/L (ref 136–145)
TRIGL SERPL-MCNC: 148 MG/DL (ref 30–149)
TSI SER-ACNC: 2.69 MIU/ML (ref 0.55–4.78)
VIT B12 SERPL-MCNC: 398 PG/ML (ref 211–911)
VLDLC SERPL CALC-MCNC: 25 MG/DL (ref 0–30)
WBC # BLD AUTO: 5.5 X10(3) UL (ref 4–11)

## 2024-02-26 PROCEDURE — 80061 LIPID PANEL: CPT

## 2024-02-26 PROCEDURE — 36415 COLL VENOUS BLD VENIPUNCTURE: CPT

## 2024-02-26 PROCEDURE — 82607 VITAMIN B-12: CPT

## 2024-02-26 PROCEDURE — 85025 COMPLETE CBC W/AUTO DIFF WBC: CPT

## 2024-02-26 PROCEDURE — 84443 ASSAY THYROID STIM HORMONE: CPT

## 2024-02-26 PROCEDURE — 80053 COMPREHEN METABOLIC PANEL: CPT

## 2024-02-27 NOTE — TELEPHONE ENCOUNTER
Message left for pt to call back regarding stool test that Dr. Zavala wants her to have due to drop in Hgb.

## 2024-02-27 NOTE — TELEPHONE ENCOUNTER
Patient returned our call ( Identified name and  )    Informed she needs to have stool testing done,to go to lab to  kit and bring home for the stool collection      Provided locations/ hours for [facility] lab    Patient verbalizes understanding and agrees with plan.

## 2024-03-06 ENCOUNTER — APPOINTMENT (OUTPATIENT)
Dept: LAB | Facility: HOSPITAL | Age: 74
End: 2024-03-06
Attending: INTERNAL MEDICINE
Payer: MEDICARE

## 2024-03-06 PROCEDURE — 82274 ASSAY TEST FOR BLOOD FECAL: CPT

## 2024-03-08 LAB — HEMOCCULT STL QL: NEGATIVE

## 2024-04-02 ENCOUNTER — HOSPITAL ENCOUNTER (OUTPATIENT)
Dept: MAMMOGRAPHY | Facility: HOSPITAL | Age: 74
Discharge: HOME | End: 2024-04-02
Attending: INTERNAL MEDICINE
Payer: MEDICARE

## 2024-04-02 DIAGNOSIS — Z12.31 ENCOUNTER FOR SCREENING MAMMOGRAM FOR MALIGNANT NEOPLASM OF BREAST: ICD-10-CM

## 2024-04-02 PROCEDURE — 77067 SCR MAMMO BI INCL CAD: CPT | Performed by: INTERNAL MEDICINE

## 2024-04-02 PROCEDURE — 77063 BREAST TOMOSYNTHESIS BI: CPT | Performed by: INTERNAL MEDICINE

## 2024-04-26 ENCOUNTER — TELEPHONE (OUTPATIENT)
Dept: PULMONOLOGY | Facility: CLINIC | Age: 74
End: 2024-04-26

## 2024-06-19 ENCOUNTER — OFFICE VISIT (OUTPATIENT)
Dept: NEUROLOGY | Facility: CLINIC | Age: 74
End: 2024-06-19

## 2024-06-19 VITALS — SYSTOLIC BLOOD PRESSURE: 100 MMHG | DIASTOLIC BLOOD PRESSURE: 62 MMHG

## 2024-06-19 DIAGNOSIS — R29.818 TRANSIENT NEUROLOGICAL SYMPTOMS: Primary | ICD-10-CM

## 2024-06-19 PROCEDURE — 99204 OFFICE O/P NEW MOD 45 MIN: CPT | Performed by: OTHER

## 2024-06-19 NOTE — PROGRESS NOTES
[facility]  [address] [place] [address], [address]  [facility] [address]  [phone]              Date: June 19, 2024  Patient Name: Verito Ewing   MRN: [id number]    Reason for Evaluation: Behavioral arrest    HPI:     Verito Ewing is a 73 year old woman with past medical history of hypertension, hyperlipidemia, basal cell carcinoma, [place] on CPAP who presents for an episode of behavioral arrest.    She has had 2 episodes of behavioral arrest earlier this year, the first being on Ash Wednesday and the next the week after.  She was walking, then stopped suddenly, remained awake, she thinks she could have spoke during event but did not try.  Lasted a few seconds.      Mother with seizures, described as nocturnal generalized epilepsy.  No personal hx of seizures.  In [place], [place] in airport and hit her right forehead, no concussion symptoms, no LOC.  No CNS infections.  Has had measles, chickenpox and mumps as a child.  No known strokes or masses on recent CT brain.      6/6/2024 presented to an outside ER.  She was outside playing mini golf with her family with a temperature in the high 80s.  She felt dizzy, then fell to the ground.  No loss of consciousness.  CT brain was done that showed no acute finding.  CT cervical spine with spondylosis but no acute findings.    OUTPATIENT MEDICATIONS  Current Outpatient Medications on File Prior to Visit   Medication Sig Dispense Refill    hydroCHLOROthiazide 12.5 MG Oral Cap Take 1 capsule (12.5 mg total) by mouth daily. 90 capsule 3    simvastatin 20 MG Oral Tab Take 1 tablet (20 mg total) by mouth nightly. 90 tablet 3    losartan 100 MG Oral Tab Take 1 tablet (100 mg total) by mouth daily. 90 tablet 3    Cholecalciferol (VITAMIN D) 50 MCG (2000 UT) Oral Cap Take by mouth.      multiple vitamin Oral Chew Tab Chew 1 tablet by mouth daily.      Tobramycin Sulfate 0.3 % Ophthalmic Solution INSTILL ONE DROP IN AFFECTED EYE FOUR TIMES DAILY       No  current facility-administered medications on file prior to visit.       MEDICAL HISTORY  Past Medical History:    Basal cell carcinoma    Basal cell carcinoma    High blood pressure    High cholesterol    [place] on CPAP    Osteoarthritis    Other and unspecified hyperlipidemia    Personal history of malignant neoplasm of skin    Unspecified essential hypertension    Visual impairment    glasses       SURGICAL HISTORY  Past Surgical History:   Procedure Laterality Date    Colonoscopy  10/2015    Colonoscopy  10/2020    Colonoscopy N/A 10/28/2020    Procedure: COLONOSCOPY;  Surgeon: David Cervantes MD;  Location: [facility] ENDOSCOPY    Dilation/curettage,diagnostic      Renny biopsy stereo w/calc 1 site right (cpt=19081)  09/27/2023    buckle clip    Other      Excision of basal cell carcinoma with skin graft     Other Left 05/2005    wrist fx surgery    Other surgical history  12/12/2017    left knee torn meniscus    Tonsillectomy         SOCIAL HISTORY  Social History     Socioeconomic History    Marital status: Single   Tobacco Use    Smoking status: Never    Smokeless tobacco: Never   Vaping Use    Vaping status: Never Used   Substance and Sexual Activity    Alcohol use: No     Alcohol/week: 0.0 standard drinks of alcohol     Comment: Rarely    Drug use: No   Other Topics Concern    Caffeine Concern Yes     Comment: Coffee, 1 cup per day     Reaction to local anesthetic No       FAMILY HISTORY  Family History   Problem Relation Age of Onset    Basal Cell Self     Ovarian Cancer Mother 84        Cause of death     Heart Disease Father 59        Cause of death    No Known Problems Sister     No Known Problems Daughter     No Known Problems Maternal Grandmother     No Known Problems Paternal Grandmother     Cancer Paternal Grandfather         Stomach     No Known Problems Maternal Aunt     No Known Problems Paternal Aunt     No Known Problems Maternal Cousin Female     No Known Problems Maternal Cousin Male     No Known  Problems Paternal Cousin Female     No Known Problems Paternal Cousin Male     Heart Disease Other     Breast Cancer Neg     DCIS Neg     LCIS Neg     BRCA gene + Neg     Ashkenazi Christianity Descent Neg        ALLERGIES  Allergies   Allergen Reactions    Lisinopril ANGIOEDEMA, FACE FLUSHING and SWELLING    Naproxen HIVES     Other reaction(s): NAPROXEN       REVIEW OF SYSTEMS:   13-point review of systems was done and is negative unless otherwise stated in HPI.     PHYSICAL EXAM:   /62 (BP Location: Left arm, Patient Position: Sitting)   General appearance: Well appearing, alert and in no acute distress  Skin: skin color normal.  No rashes or lesions.    Head: Normocephalic, atraumatic.    Neurological:  Mental Status: Alert, oriented to situation/normal fund of knowledge, Follows commands, and Speech fluent and appropriate.  Cranial Nerves: PERRL, visual fields intact to confrontation, extraocular movements intact, facial sensation intact, face symmetric, no facial droop or ptosis, normal bedside auditory acuity, no dysarthria  Motor: muscle strength 5/5 both upper and lower extremities  Reflexes: UE and LE reflexes are equal and reactive  Sensation: intact to light touch  Coordination: Finger-to- nose-finger intact bilaterally   Gait: normal-based.      LABS/DATA:  CBC with anemia 9.5/31.3, worse anemia than baseline  CMP with elevated BUN/creatinine 24/1.22, NIKOS  Mild vitamin B12 deficiency    IMAGING:  [facility] CT brain as stated in HPI    ASSESSMENT:  The patient is a 73 year old  woman with past medical history of hypertension, hyperlipidemia, basal cell carcinoma, [place] on CPAP who presents for 2 episodes of behavioral arrest with retained consciousness with seizure risk factors of TBI and mother with generalized epilepsy.   Her neurological examination is normal.      Transient neurological symptoms worrisome for focal epilepsy with impaired consciousness.  Discussed my concerns, we will obtain  testing first before considering starting medications.     -MRI brain  -Routine EEG  -After testing is done, discuss Keppra 500 mg twice daily.  If another episode occurs, start Keppra 500 mg twice daily.  She knows to let me know immediately if another episode occurs.      Follow up: 3 months     Discussed indication, administration, dose, and side effects with patient of any medications personally prescribed. Patient was advised to let my office know if they have any questions or concerns.       Today, I personally spent 20 minutes in this case, including chart review, time spent with patient doing face to face evaluation w/ interview and exam and patient education, counseling, and time was spent in patient education, counseling, and coordination of care as described above.   Issues discussed: Diagnosis and implications on future health, benefits and side effects of present and future medications, test results as well as further testing and medications required.    This note was prepared using Dragon Medical voice recognition dictation software and as a result, errors may occur. When identified, these errors have been corrected. While every attempt is made to correct errors during dictation, discrepancies may still exist    NIHARIKA Hernandez DO   Staff Neurologist   6/19/2024  1:50 PM

## 2024-06-27 ENCOUNTER — HOSPITAL ENCOUNTER (OUTPATIENT)
Dept: ELECTROPHYSIOLOGY | Facility: HOSPITAL | Age: 74
Discharge: HOME | End: 2024-06-27
Attending: Other
Payer: MEDICARE

## 2024-06-27 DIAGNOSIS — R29.818 TRANSIENT NEUROLOGICAL SYMPTOMS: ICD-10-CM

## 2024-06-27 PROCEDURE — 95819 EEG AWAKE AND ASLEEP: CPT | Performed by: INTERNAL MEDICINE

## 2024-06-27 NOTE — PROCEDURES
Outpatient EEG report    REFERRING PHYSICIAN: Nova Hernandez DO    PCP and phone number:  SHAYNA DONALDSON MD  [phone]    TECHNIQUE: 21 channels of EEG, 2 channels of EOG, and 1 channel of EKG were recorded utilizing the 10-20 International System of Electrode Placement. The recording was performed in a digitized monopolar referential format and playback was reformatted into various referential and bipolar montages utilizing appropriate filter settings. Automatic seizure and spike detection programs were utilized throughout the recording. Video was recorded during the study    CLINICAL DATA:  Patient is sent for the evaluation of possible seizures.    MEDICATION:    Current Outpatient Medications:     hydroCHLOROthiazide 12.5 MG Oral Cap, Take 1 capsule (12.5 mg total) by mouth daily., Disp: 90 capsule, Rfl: 3    simvastatin 20 MG Oral Tab, Take 1 tablet (20 mg total) by mouth nightly., Disp: 90 tablet, Rfl: 3    losartan 100 MG Oral Tab, Take 1 tablet (100 mg total) by mouth daily., Disp: 90 tablet, Rfl: 3    Cholecalciferol (VITAMIN D) 50 MCG (2000 UT) Oral Cap, Take by mouth., Disp: , Rfl:     Tobramycin Sulfate 0.3 % Ophthalmic Solution, INSTILL ONE DROP IN AFFECTED EYE FOUR TIMES DAILY, Disp: , Rfl:     multiple vitamin Oral Chew Tab, Chew 1 tablet by mouth daily., Disp: , Rfl:     BACKGROUND  The posterior dominant rhythm consisted of well-regulated 9-10 Hz rhythmic waveforms, symmetrically distributed over both posterior quadrants and was reactive to eye opening.    ACTIVATION:  Hyperventilation: Not performed  Photic stimulation: Performed, no abnormalities noted  Sleep: Normal sleep architecture was seen.    EEG ABNORMALITY  Slowing: None  Epileptiform activity: None  Seizures: None  Abnormal movements: None    IMPRESSION:  This is a normal wake and sleep EEG.   No epileptiform activity, focal slowing, nor seizures were seen throughout the study    Aruna Greer M.D.  Neurology

## 2024-07-17 ENCOUNTER — PATIENT MESSAGE (OUTPATIENT)
Dept: NEUROLOGY | Facility: CLINIC | Age: 74
End: 2024-07-17

## 2024-07-17 NOTE — TELEPHONE ENCOUNTER
From: Verito Ewing  To: Nova Ortized  Sent: 7/17/2024 1:59 PM CDT  Subject: MRI Brain    Severiano scheduled for my MRI tomorrow at 7pm. I had a  cataract removal and implant on July 11. Will there be any problemwith having the MRI?    Verito Ewing  [phone]

## 2024-07-18 ENCOUNTER — HOSPITAL ENCOUNTER (OUTPATIENT)
Dept: MRI IMAGING | Age: 74
Discharge: HOME | End: 2024-07-18
Attending: Other
Payer: MEDICARE

## 2024-07-18 DIAGNOSIS — R29.818 TRANSIENT NEUROLOGICAL SYMPTOMS: ICD-10-CM

## 2024-07-18 PROCEDURE — 70553 MRI BRAIN STEM W/O & W/DYE: CPT | Performed by: OTHER

## 2024-07-18 PROCEDURE — A9575 INJ GADOTERATE MEGLUMI 0.1ML: HCPCS | Performed by: OTHER

## 2024-07-18 RX ORDER — GADOTERATE MEGLUMINE 376.9 MG/ML
20 INJECTION INTRAVENOUS
Status: COMPLETED | OUTPATIENT
Start: 2024-07-18 | End: 2024-07-18

## 2024-07-18 RX ADMIN — GADOTERATE MEGLUMINE 19 ML: 376.9 INJECTION INTRAVENOUS at 18:59:00

## 2024-08-01 ENCOUNTER — NURSE TRIAGE (OUTPATIENT)
Dept: INTERNAL MEDICINE CLINIC | Facility: CLINIC | Age: 74
End: 2024-08-01

## 2024-08-01 NOTE — TELEPHONE ENCOUNTER
May be better for her to come in and see anyone.  May be just a viral there is 1000 different viruses out there that really antibiotics will do absolutely nothing is more alleviating the symptoms.  Bronchitis can also be viral and we do not do antibiotics because of the concern with resistance in the future and antibiotics not working as needed.  Benadryl will help to alleviate the symptoms we can try montelukast or Singulair 10 mg at night for 7 nights which will help alleviate the symptoms.  Stay away from cold liquids and dairy.  Room temperature liquids and try to hydrate.  But I would recommend for her to see anyone.

## 2024-08-01 NOTE — TELEPHONE ENCOUNTER
Patient notified, verbalized understanding.     Future Appointments   Date Time Provider Department Center   8/2/2024 11:00 AM Maggy Jarrett MD [facility]   10/22/2024  1:40 PM Nova Hernandez DO [facility]

## 2024-08-01 NOTE — TELEPHONE ENCOUNTER
Action Requested: Summary for Provider     []  Critical Lab, Recommendations Needed  [] Need Additional Advice  []   FYI    [x]   Need Orders  [x] Need Medications Sent to Pharmacy  []  Other     SUMMARY: started with scratchy throat and cough,tight cough ,now with severe  nasal congestion and post nasal drip, DX=900/80, negative home COVID test this morning . Took  over the counter Benadryl, delsym, has been drinking warm tea with honey and using hard candy cough drop.       Declines appointment, requesting to send her message and request for medication to DR Zavala. Afraid that her symptoms will come  to bronchitis.       Reason for call: Nasal Congestion  Onset: 2 days     oat, tight cough, very  nasal cingested, benadryl and delsym       Reason for Disposition   Lots of coughing    Protocols used: Sinus Pain or Congestion-A-OH

## 2024-08-02 ENCOUNTER — OFFICE VISIT (OUTPATIENT)
Dept: INTERNAL MEDICINE CLINIC | Facility: CLINIC | Age: 74
End: 2024-08-02
Payer: MEDICARE

## 2024-08-02 VITALS
BODY MASS INDEX: 39.56 KG/M2 | HEIGHT: 62 IN | WEIGHT: 215 LBS | DIASTOLIC BLOOD PRESSURE: 64 MMHG | SYSTOLIC BLOOD PRESSURE: 99 MMHG | HEART RATE: 79 BPM | OXYGEN SATURATION: 96 %

## 2024-08-02 DIAGNOSIS — R05.1 ACUTE COUGH: Primary | ICD-10-CM

## 2024-08-02 PROCEDURE — 99214 OFFICE O/P EST MOD 30 MIN: CPT | Performed by: INTERNAL MEDICINE

## 2024-08-02 RX ORDER — PREDNISOLONE ACETATE 10 MG/ML
SUSPENSION/ DROPS OPHTHALMIC
COMMUNITY
Start: 2024-05-15

## 2024-08-02 RX ORDER — BENZONATATE 100 MG/1
100 CAPSULE ORAL 2 TIMES DAILY PRN
Qty: 30 CAPSULE | Refills: 0 | Status: SHIPPED | OUTPATIENT
Start: 2024-08-02

## 2024-08-02 RX ORDER — FLUTICASONE PROPIONATE 50 MCG
2 SPRAY, SUSPENSION (ML) NASAL DAILY
Qty: 1 EACH | Refills: 0 | Status: SHIPPED | OUTPATIENT
Start: 2024-08-02

## 2024-08-02 NOTE — PROGRESS NOTES
Verito Ewing is a 73 year old female.  Chief Complaint   Patient presents with    Cough     Sore throat     Nasal Congestion      Negative covid test at home 8/1/24  - hx of Bronchitis        HPI:   Urgent visit   C/c sore throat and nasal congestion   C/o dry itchy throat , PND and progresses and usually goes into bronchitis -- usually once a yr in jan   ROS as below     PMH  HTN  HL  Robert on cpap      Current Outpatient Medications   Medication Sig Dispense Refill    prednisoLONE 1 % Ophthalmic Suspension POST-OP: 1 drop in surgery eye after surgery QID x 1 week, TID x 1 week, BID x 1 week, QD x 1 week      Methylcobalamin (B12-ACTIVE OR) Take by mouth.      benzonatate 100 MG Oral Cap Take 1 capsule (100 mg total) by mouth 2 (two) times daily as needed. 30 capsule 0    fluticasone propionate 50 MCG/ACT Nasal Suspension 2 sprays by Each Nare route daily. 1 each 0    hydroCHLOROthiazide 12.5 MG Oral Cap Take 1 capsule (12.5 mg total) by mouth daily. 90 capsule 3    simvastatin 20 MG Oral Tab Take 1 tablet (20 mg total) by mouth nightly. 90 tablet 3    losartan 100 MG Oral Tab Take 1 tablet (100 mg total) by mouth daily. 90 tablet 3    Cholecalciferol (VITAMIN D) 50 MCG (2000 UT) Oral Cap Take by mouth.      Tobramycin Sulfate 0.3 % Ophthalmic Solution INSTILL ONE DROP IN AFFECTED EYE FOUR TIMES DAILY      multiple vitamin Oral Chew Tab Chew 1 tablet by mouth daily.        Past Medical History:    Basal cell carcinoma    Basal cell carcinoma    High blood pressure    High cholesterol    ROBERT on CPAP    Osteoarthritis    Other and unspecified hyperlipidemia    Personal history of malignant neoplasm of skin    Unspecified essential hypertension    Visual impairment    glasses      Past Surgical History:   Procedure Laterality Date    Colonoscopy  10/2015    Colonoscopy  10/2020    Colonoscopy N/A 10/28/2020    Procedure: COLONOSCOPY;  Surgeon: David Cervantes MD;  Location: [facility] ENDOSCOPY     Dilation/curettage,diagnostic      Renny biopsy stereo w/calc 1 site right (cpt=19081)  09/27/2023    buckle clip    Other      Excision of basal cell carcinoma with skin graft     Other Left 05/2005    wrist fx surgery    Other surgical history  12/12/2017    left knee torn meniscus    Tonsillectomy        Social History:  Social History     Socioeconomic History    Marital status: Single   Tobacco Use    Smoking status: Never    Smokeless tobacco: Never   Vaping Use    Vaping status: Never Used   Substance and Sexual Activity    Alcohol use: No     Alcohol/week: 0.0 standard drinks of alcohol     Comment: Rarely    Drug use: No   Other Topics Concern    Caffeine Concern Yes     Comment: Coffee, 1 cup per day     Reaction to local anesthetic No        REVIEW OF SYSTEMS:   GENERAL HEALTH: No fevers, chills, sweats, + fatigue=- from coughing   VISION: No recent vision problems, blurry vision or double vision  HEENT: + decreased hearing- using hearing aides , no  ear pain, +  nasal congestion, no sore throat  SKIN: denies any unusual skin lesions or rashes  RESPIRATORY: denies shortness of breath, + cough- clear ,no  wheezing  NEURO: denies headaches , anxiety, depression    EXAM:   BP 99/64   Pulse 79   Ht 5' 2\" (1.575 m)   Wt 215 lb (97.5 kg)   SpO2 96%   BMI 39.32 kg/m²   GENERAL: well developed, well nourished,in no apparent distress  SKIN: no rashes,no suspicious lesions  HEENT: atraumatic, normocephalic   NECK: supple,no adenopathy  LUNGS: clear to auscultation, no wheeze  CARDIO: RRR without murmur  EXTREMITIES: no cyanosis, or edema    ASSESSMENT AND PLAN:   Diagnoses and all orders for this visit:    Acute cough  -     benzonatate 100 MG Oral Cap; Take 1 capsule (100 mg total) by mouth 2 (two) times daily as needed.  -     fluticasone propionate 50 MCG/ACT Nasal Suspension; 2 sprays by Each Nare route daily.      Advised patient to take Zyrtec that she has at home already and will give her the Tessalon  Perles that she can swallow whole to help with her cough and also will give her Flonase and if she is not better by next Tuesday she can call me back and I will send her a LEXY-Duane    F/u pcp for BP check and if going lower then can consider stopping hydrochlorothiazide         The patient indicates understanding of these issues and agrees to the plan.  No follow-ups on file.

## 2024-08-12 RX ORDER — LOSARTAN POTASSIUM 100 MG/1
100 TABLET ORAL DAILY
Qty: 90 TABLET | Refills: 3 | Status: SHIPPED | OUTPATIENT
Start: 2024-08-12

## 2024-08-12 NOTE — TELEPHONE ENCOUNTER
Refill Per Protocol     Requested Prescriptions   Pending Prescriptions Disp Refills    LOSARTAN 100 MG Oral Tab [Pharmacy Med Name: LOSARTAN POTASSIUM 100 MG TAB] 90 tablet 3     Sig: TAKE 1 TABLET BY MOUTH EVERY DAY       Hypertension Medications Protocol Passed - 8/8/2024 12:13 AM        Passed - CMP or BMP in past 12 months        Passed - Last BP reading less than 140/90     BP Readings from Last 1 Encounters:   08/02/24 99/64               Passed - In person appointment or virtual visit in the past 12 mos or appointment in next 3 mos     Recent Outpatient Visits              1 week ago Acute cough    [facility] Maggy Mead MD    Office Visit    1 month ago Transient neurological symptoms    [facility] Nova Hernandez DO    Office Visit    5 months ago [place] (obstructive sleep apnea)    [facility], ElsieKenan Camargo DO    Office Visit    6 months ago Encounter for annual health examination    [facility], ElsieJody Rivas MD    Office Visit    9 months ago [place] (obstructive sleep apnea)    [facility], ElsieKenan Caamrgo DO    Office Visit          Future Appointments         Provider Department Appt Notes    In 2 months [facility] DO [facility] Results of tests.                    Passed - EGFRCR or GFRNAA > 50     GFR Evaluation  EGFRCR: 59 , resulted on 2/26/2024                 Future Appointments         Provider Department Appt Notes    In 2 months [facility] DO [facility] Results of tests.          Recent Outpatient Visits              1 week ago Acute cough    [facility], Maggy Mead MD    Office Visit    1 month ago Transient neurological  symptoms    [facility], [facility], Nova Sanon,     Office Visit    5 months ago [place] (obstructive sleep apnea)    [facility], [facility], Kenan Lorenzo,     Office Visit    6 months ago Encounter for annual health examination    [facility], [facility], Jody Huang MD    Office Visit    9 months ago [place] (obstructive sleep apnea)    [facility], [facility], Kenan Lorenzo, DO    Office Visit

## 2024-08-29 DIAGNOSIS — R05.1 ACUTE COUGH: ICD-10-CM

## 2024-08-30 RX ORDER — FLUTICASONE PROPIONATE 50 MCG
2 SPRAY, SUSPENSION (ML) NASAL DAILY
Qty: 16 ML | Refills: 0 | OUTPATIENT
Start: 2024-08-30

## 2024-09-17 ENCOUNTER — OFFICE VISIT (OUTPATIENT)
Dept: NEUROLOGY | Facility: CLINIC | Age: 74
End: 2024-09-17
Payer: MEDICARE

## 2024-09-17 VITALS — WEIGHT: 200 LBS | BODY MASS INDEX: 36.8 KG/M2 | HEIGHT: 62 IN

## 2024-09-17 DIAGNOSIS — G40.209 FOCAL EPILEPSY WITH IMPAIRMENT OF CONSCIOUSNESS (HCC): Primary | ICD-10-CM

## 2024-09-17 PROCEDURE — 99213 OFFICE O/P EST LOW 20 MIN: CPT | Performed by: OTHER

## 2024-09-17 RX ORDER — MOXIFLOXACIN 5 MG/ML
SOLUTION/ DROPS OPHTHALMIC
COMMUNITY

## 2024-09-17 RX ORDER — HYDROCHLOROTHIAZIDE 12.5 MG/1
12.5 CAPSULE ORAL DAILY
Qty: 90 CAPSULE | Refills: 3 | Status: SHIPPED | OUTPATIENT
Start: 2024-09-17

## 2024-09-17 NOTE — PROGRESS NOTES
[facility]  [address], [address]  [place] [address]  [phone]          Established  Follow Up Visit       Date: September 17, 2024  Patient Name: Verito Ewing   MRN: [id number]  Primary physician: [address] [facility] [id number]    Interval History:   --Here to review testing.  No further episodes.  She attributes her episodes to imbalance related to her knee issues but cannot explain how this involves her behavioral arrest.        OUTPATIENT MEDICATIONS  Current Outpatient Medications on File Prior to Visit   Medication Sig Dispense Refill    moxifloxacin 0.5 % Ophthalmic Solution       losartan 100 MG Oral Tab Take 1 tablet (100 mg total) by mouth daily. 90 tablet 3    prednisoLONE 1 % Ophthalmic Suspension POST-OP: 1 drop in surgery eye after surgery QID x 1 week, TID x 1 week, BID x 1 week, QD x 1 week      Methylcobalamin (B12-ACTIVE OR) Take by mouth.      benzonatate 100 MG Oral Cap Take 1 capsule (100 mg total) by mouth 2 (two) times daily as needed. 30 capsule 0    fluticasone propionate 50 MCG/ACT Nasal Suspension 2 sprays by Each Nare route daily. 1 each 0    hydroCHLOROthiazide 12.5 MG Oral Cap Take 1 capsule (12.5 mg total) by mouth daily. 90 capsule 3    simvastatin 20 MG Oral Tab Take 1 tablet (20 mg total) by mouth nightly. 90 tablet 3    Cholecalciferol (VITAMIN D) 50 MCG (2000 UT) Oral Cap Take by mouth.      Tobramycin Sulfate 0.3 % Ophthalmic Solution INSTILL ONE DROP IN AFFECTED EYE FOUR TIMES DAILY      multiple vitamin Oral Chew Tab Chew 1 tablet by mouth daily.       No current facility-administered medications on file prior to visit.         PHYSICAL EXAM:   Ht 62\"   Wt 200 lb (90.7 kg)   BMI 36.58 kg/m²   General appearance: Well appearing, and in no acute distress  Skin: skin color, texture normal.  No rashes or lesions.    Head: Normocephalic, atraumatic.    Neurological exam:    Mental Status:   Attention/Concentration: intact attention  on bedside test   Fund of knowledge: intact  Speech: no dysarthria or aphasia     LABS/DATA:  CBC with anemia  CMP with BUN/creatinine 24/1.22    IMAGING:  MRI brain  I PERSONALLY REVIEWED THESE IMAGES.     ASSESSMENT:  The patient is a 74 year old woman with past medical history of hypertension, hyperlipidemia, basal cell carcinoma, [place] on CPAP who presents for 2 episodes of behavioral arrest with retained consciousness with seizure risk factors of TBI and mother with generalized epilepsy.   Her neurological examination is normal.    EEG is normal  MRI brain with and without normal other than mild chronic microvascular ischemic change     Focal epilepsy with impaired consciousness    -She attributes her 2 episodes to imbalance due to knee issues but this does not explain her behavioral arrest.  I discussed her most likely diagnosis is focal epilepsy.  Her transient symptoms are not consistent with stroke, migraine or presyncope.  We explained her seizure risk factors (family hx in first degree relative and TBI history) and that normal/negative MRI brain and EEG do not rule out epilepsy.      We discussed risk of untreated epilepsy including SUDEP, morbidity and mortality (including to others) if she continues to drive without treating for seizures.  She is not accepting of diagnosis of epilepsy and does not wish to start seizure medication.  We also discussed that if she continues to drive despite recommendations, we do need to inform .      I will ask our office to look into best next steps.  She may benefit from escalation in care to an epileptologist to see if they agree with my assessment.      Discussed indication, administration, dose, and side effects with patient of any medications personally prescribed. Patient was advised to let my office know if they have any questions or concerns.       Today, I personally spent 20 minutes in this case, including chart review, time spent with patient  doing face to face evaluation w/ interview and exam and patient education, counseling, and time was spent in patient education, counseling, and coordination of care as described above.   Issues discussed: Diagnosis and implications on future health, benefits and side effects of present and future medications, test results as well as further testing and medications required.    This note was prepared using Dragon Medical voice recognition dictation software and as a result, errors may occur. When identified, these errors have been corrected. While every attempt is made to correct errors during dictation, discrepancies may still exist    NIHARIKA Hernandez DO   Staff Physician, Neurology   9/17/2024  11:40 AM

## 2024-09-17 NOTE — TELEPHONE ENCOUNTER
Refill passed per [facility] protocol.  Requested Prescriptions   Pending Prescriptions Disp Refills    HYDROCHLOROTHIAZIDE 12.5 MG Oral Cap [Pharmacy Med Name: HYDROCHLOROTHIAZIDE 12.5 MG CP] 90 capsule 3     Sig: TAKE 1 CAPSULE BY MOUTH EVERY DAY       Hypertension Medications Protocol Passed - 9/13/2024 12:10 AM        Passed - CMP or BMP in past 12 months        Passed - Last BP reading less than 140/90     BP Readings from Last 1 Encounters:   08/02/24 99/64               Passed - In person appointment or virtual visit in the past 12 mos or appointment in next 3 mos     Recent Outpatient Visits              Today Focal epilepsy with impairment of consciousness (HCC)    [facility], [place] Nova Hernandez,     Office Visit    1 month ago Acute cough    [facility], [place]    Office Visit    3 months ago Transient neurological symptoms    [facility], [place] Nova Hernandez,     Office Visit    7 months ago [place] (obstructive sleep apnea)    [facility], [facility], [place] Kenan Lyn,     Office Visit    7 months ago Encounter for annual health examination    [facility] Jody Zavala MD    Office Visit                      Passed - EGFRCR or GFRNAA > 50     GFR Evaluation  EGFRCR: 59 , resulted on 2/26/2024               Recent Outpatient Visits              Today Focal epilepsy with impairment of consciousness (HCC)    [facility], [place] Nova Hernandez,     Office Visit    1 month ago Acute cough    [facility], [place]    Office Visit    3 months ago Transient neurological symptoms    [facility] Nova Hernandez,     Office Visit    7 months ago [place] (obstructive sleep apnea)     [facility], [place] [facility], [place]    Office Visit    7 months ago Encounter for annual health examination    [facility], [facility], [place] Jody Zavala MD    Office Visit

## 2024-09-19 ENCOUNTER — TELEPHONE (OUTPATIENT)
Dept: NEUROLOGY | Facility: CLINIC | Age: 74
End: 2024-09-19

## 2024-10-02 DIAGNOSIS — R05.1 ACUTE COUGH: ICD-10-CM

## 2024-10-04 RX ORDER — FLUTICASONE PROPIONATE 50 MCG
2 SPRAY, SUSPENSION (ML) NASAL DAILY
Qty: 48 ML | Refills: 3 | Status: SHIPPED | OUTPATIENT
Start: 2024-10-04

## 2024-10-04 NOTE — TELEPHONE ENCOUNTER
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)    [facility], [place],     Office Visit    2 months ago Acute cough    [facility], [place]    Office Visit    3 months ago Transient neurological symptoms    [facility], [place],     Office Visit    7 months ago [place] (obstructive sleep apnea)    [facility], [facility], [place],     Office Visit    7 months ago Encounter for annual health examination    [facility], [place] Jody Zavala MD    Office Visit          Future Appointments         Provider Department Appt Notes    In 4 weeks Jemma Cole MD [facility] neck snags                           Future Appointments         Provider Department Appt Notes    In 4 weeks Jemma Cole MD [facility] neck snaharmony          Recent Outpatient Visits              2 weeks ago 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 [place]Maggy Santos MD    Office Visit    3 months ago Transient neurological symptoms    [facility],  Nova Sanon,     Office Visit    7 months ago [place] (obstructive sleep apnea)    [facility], Kenan Lorenzo,     Office Visit    7 months ago Encounter for annual health examination    [facility], [facility], Jody Huang MD    Office Visit

## 2024-11-02 ENCOUNTER — OFFICE VISIT (OUTPATIENT)
Dept: DERMATOLOGY CLINIC | Facility: CLINIC | Age: 74
End: 2024-11-02
Payer: MEDICARE

## 2024-11-02 DIAGNOSIS — Z85.828 ENCOUNTER FOR FOLLOW-UP SURVEILLANCE OF SKIN CANCER: ICD-10-CM

## 2024-11-02 DIAGNOSIS — L81.4 LENTIGO: ICD-10-CM

## 2024-11-02 DIAGNOSIS — D23.9 BENIGN NEOPLASM OF SKIN, UNSPECIFIED LOCATION: ICD-10-CM

## 2024-11-02 DIAGNOSIS — L82.1 SK (SEBORRHEIC KERATOSIS): Primary | ICD-10-CM

## 2024-11-02 DIAGNOSIS — Z08 ENCOUNTER FOR FOLLOW-UP SURVEILLANCE OF SKIN CANCER: ICD-10-CM

## 2024-11-02 DIAGNOSIS — D22.9 MULTIPLE NEVI: ICD-10-CM

## 2024-11-17 NOTE — PROGRESS NOTES
Verito Ewing is a 74 year old female.  HPI:     CC:    Chief Complaint   Patient presents with    Upper Body Exam     LOV 10/12/20- Pt presents for an Upper Body Skin Exam. Pt denies any lesions or areas of concern but has several skin tags she would like removed.   Personal Hx of BCC (Under Right eye). Pt denies a family Hx of skin ca.           Allergies:  Lisinopril and Naproxen    HISTORY:    Past Medical History:    Basal cell carcinoma    Basal cell carcinoma    High blood pressure    High cholesterol    [place] on CPAP    Osteoarthritis    Other and unspecified hyperlipidemia    Personal history of malignant neoplasm of skin    Unspecified essential hypertension    Visual impairment    glasses      Past Surgical History:   Procedure Laterality Date    Colonoscopy  10/2015    Colonoscopy  10/2020    Colonoscopy N/A 10/28/2020    Procedure: COLONOSCOPY;  Surgeon: David Cervantes MD;  Location: [facility] ENDOSCOPY    Dilation/curettage,diagnostic      Renny biopsy stereo w/calc 1 site right (cpt=19081)  09/27/2023    buckle clip    Other      Excision of basal cell carcinoma with skin graft     Other Left 05/2005    wrist fx surgery    Other surgical history  12/12/2017    left knee torn meniscus    Tonsillectomy        Family History   Problem Relation Age of Onset    Basal Cell Self     Ovarian Cancer Mother 84        Cause of death     Heart Disease Father 59        Cause of death    No Known Problems Sister     No Known Problems Daughter     No Known Problems Maternal Grandmother     No Known Problems Paternal Grandmother     Cancer Paternal Grandfather         Stomach     No Known Problems Maternal Aunt     No Known Problems Paternal Aunt     No Known Problems Maternal Cousin Female     No Known Problems Maternal Cousin Male     No Known Problems Paternal Cousin Female     No Known Problems Paternal Cousin Male     Heart Disease Other     Breast Cancer Neg     DCIS Neg     LCIS Neg     BRCA gene + Neg      Ashkenazi Mor[employer] Descent Neg       Social History     Socioeconomic History    Marital status: Single   Tobacco Use    Smoking status: Never    Smokeless tobacco: Never   Vaping Use    Vaping status: Never Used   Substance and Sexual Activity    Alcohol use: No     Alcohol/week: 0.0 standard drinks of alcohol     Comment: Rarely    Drug use: No   Other Topics Concern    Caffeine Concern Yes     Comment: Coffee, 1 cup per day     Reaction to local anesthetic No    Pt has a pacemaker No    Pt has a defibrillator No        Current Outpatient Medications   Medication Sig Dispense Refill    fluticasone propionate 50 MCG/ACT Nasal Suspension 2 sprays by Nasal route daily. 48 mL 3    hydroCHLOROthiazide 12.5 MG Oral Cap Take 1 capsule (12.5 mg total) by mouth daily. 90 capsule 3    losartan 100 MG Oral Tab Take 1 tablet (100 mg total) by mouth daily. 90 tablet 3    Methylcobalamin (B12-ACTIVE OR) Take by mouth.      simvastatin 20 MG Oral Tab Take 1 tablet (20 mg total) by mouth nightly. 90 tablet 3    Cholecalciferol (VITAMIN D) 50 MCG (2000 UT) Oral Cap Take by mouth.      multiple vitamin Oral Chew Tab Chew 1 tablet by mouth daily.      moxifloxacin 0.5 % Ophthalmic Solution       prednisoLONE 1 % Ophthalmic Suspension POST-OP: 1 drop in surgery eye after surgery QID x 1 week, TID x 1 week, BID x 1 week, QD x 1 week      benzonatate 100 MG Oral Cap Take 1 capsule (100 mg total) by mouth 2 (two) times daily as needed. (Patient not taking: Reported on 11/2/2024) 30 capsule 0    Tobramycin Sulfate 0.3 % Ophthalmic Solution INSTILL ONE DROP IN AFFECTED EYE FOUR TIMES DAILY (Patient not taking: Reported on 11/2/2024)       Allergies:   Allergies   Allergen Reactions    Lisinopril ANGIOEDEMA, FACE FLUSHING and SWELLING    Naproxen HIVES     Other reaction(s): NAPROXEN       Past Medical History:    Basal cell carcinoma    Basal cell carcinoma    High blood pressure    High cholesterol    [place] on CPAP    Osteoarthritis    Other  and unspecified hyperlipidemia    Personal history of malignant neoplasm of skin    Unspecified essential hypertension    Visual impairment    glasses     Past Surgical History:   Procedure Laterality Date    Colonoscopy  10/2015    Colonoscopy  10/2020    Colonoscopy N/A 10/28/2020    Procedure: COLONOSCOPY;  Surgeon: David Cervantes MD;  Location[facility] ENDOSCOPY    Dilation/curettage,diagnostic      Renny biopsy stereo w/calc 1 site right (cpt=19081)  09/27/2023    buckle clip    Other      Excision of basal cell carcinoma with skin graft     Other Left 05/2005    wrist fx surgery    Other surgical history  12/12/2017    left knee torn meniscus    Tonsillectomy       Social History     Socioeconomic History    Marital status: Single     Spouse name: Not on file    Number of children: Not on file    Years of education: Not on file    Highest education level: Not on file   Occupational History    Not on file   Tobacco Use    Smoking status: Never    Smokeless tobacco: Never   Vaping Use    Vaping status: Never Used   Substance and Sexual Activity    Alcohol use: No     Alcohol/week: 0.0 standard drinks of alcohol     Comment: Rarely    Drug use: No    Sexual activity: Not on file   Other Topics Concern     Service Not Asked    Blood Transfusions Not Asked    Caffeine Concern Yes     Comment: Coffee, 1 cup per day     Occupational Exposure Not Asked    Hobby Hazards Not Asked    Sleep Concern Not Asked    Stress Concern Not Asked    Weight Concern Not Asked    Special Diet Not Asked    Back Care Not Asked    Exercise Not Asked    Bike Helmet Not Asked    Seat Belt Not Asked    Self-Exams Not Asked    Grew up on a farm Not Asked    History of tanning Not Asked    Outdoor occupation Not Asked    Breast feeding Not Asked    Reaction to local anesthetic No    Pt has a pacemaker No    Pt has a defibrillator No   Social History Narrative    Not on file     Social Drivers of Health     Financial Resource Strain: Not  on file   Food Insecurity: Not on file   Transportation Needs: Not on file   Physical Activity: Not on file   Stress: Not on file   Social Connections: Not on file   Housing Stability: Not on file     Family History   Problem Relation Age of Onset    Basal Cell Self     Ovarian Cancer Mother 84        Cause of death     Heart Disease Father 59        Cause of death    No Known Problems Sister     No Known Problems Daughter     No Known Problems Maternal Grandmother     No Known Problems Paternal Grandmother     Cancer Paternal Grandfather         Stomach     No Known Problems Maternal Aunt     No Known Problems Paternal Aunt     No Known Problems Maternal Cousin Female     No Known Problems Maternal Cousin Male     No Known Problems Paternal Cousin Female     No Known Problems Paternal Cousin Male     Heart Disease Other     Breast Cancer Neg     DCIS Neg     LCIS Neg     BRCA gene + Neg     Ashkenazi Lutheran Descent Neg        There were no vitals filed for this visit.    HPI:    Chief Complaint   Patient presents with    Upper Body Exam     LOV 10/12/20- Pt presents for an Upper Body Skin Exam. Pt denies any lesions or areas of concern but has several skin tags she would like removed.   Personal Hx of BCC (Under Right eye). Pt denies a family Hx of skin ca.       Past notes/ records and appropriate/relevant lab results including pathology and past body maps reviewed. Updated and new information noted in current visit.     Follow-up history of BCC right medial nasal sidewall, lower lid 2003    Patient presents with concerns above.    Patient has been in their usual state of health.  History, medications, allergies reviewed as noted.      ROS:  Denies any other systemic complaints.  No new or changeing lesions other than noted above. No fevers, chills, night sweats, unusual sun sensitivity.  No other skin complaints.        History, medications, allergies reviewed as noted.       Physical Examination:      Well-developed well-nourished patient alert oriented in no acute distress.  Exam total-body performed, including scalp, head, neck, face,nails, hair, external eyes, including conjunctival mucosa, eyelids, lips external ears, back, chest,/ breasts, axillae,  abdomen, arms, legs, palms.     Multiple light to medium brown, well marginated, uniformly pigmented, macules and papules 6 mm and less scattered on exam. pigmented lesions examined with dermoscopy benign-appearing patterns.     Waxy tannish keratotic papules scattered, cherry-red vascular papules scattered.    See map today's date for lesions noted .      Otherwise remarkable for lesions as noted on map.  See details of examination  See Assessment /Plan for additional history and physical exam also:    Assessment / plan:    No orders of the defined types were placed in this encounter.      Meds & Refills for this Visit:  Requested Prescriptions      No prescriptions requested or ordered in this encounter         Encounter Diagnoses   Name Primary?    SK (seborrheic keratosis) Yes    Multiple nevi     Benign neoplasm of skin, unspecified location     Lentigo     Encounter for follow-up surveillance of skin cancer        See details on map.      Remarkable for:     right temple I [id number] bx      Waxy tan keratotic papules lesions in areas of concern as noted reassurance given.  Benign nature discussed.  Possibility of cryo, alphahydroxy acids over-the-counter retinol's discussed.  Extensive lesions over the back in particular  SKs tags over the lateral neck chest reassurance.    No new suspicious lesions noted  Reassurance regarding benign keratoses, intradermal nevus right lateral bra, temple reassurance given recommend observation currently    Patient with history of skin cancer.  No evidence of recurrence.    No new skin cancer. bcc 2003 right cheek      Please refer to map for specific lesions.  See additional diagnoses.  Pros cons of various therapies, risks  benefits discussed.Pathophysiology discussed with patient.  Therapeutic options reviewed.  See  Medications in grid.  Instructions reviewed at length.    Benign nevi, seborrheic  keratoses, cherry angiomas:  Reassurance regarding other benign skin lesions.Signs and symptoms of skin cancer, ABCDE's of melanoma discussed with patient. Sunscreen use, sun protection, self exams reviewed.  Followup as noted RTC routine checkup 6 mos - one year or p.r.n.    The patient indicates understanding of these issues and agrees to the plan.  The patient is asked to return as noted in follow-up/ above.    This note was generated using Dragon voice recognition software.  Please contact me regarding any confusion resulting from errors in recognition.

## 2025-01-14 RX ORDER — SIMVASTATIN 20 MG
20 TABLET ORAL NIGHTLY
Qty: 90 TABLET | Refills: 0 | Status: SHIPPED | OUTPATIENT
Start: 2025-01-14

## 2025-01-14 NOTE — TELEPHONE ENCOUNTER
CSS please contact patient and assist in scheduling a follow up. Per refill protocol pt due for OV. 90 day supply was sent but needs a appt for future refills.     90 day refill given on 01/14/25, appointment needed for further refills.   Refill pass per protocol    Requested Prescriptions   Pending Prescriptions Disp Refills    SIMVASTATIN 20 MG Oral Tab [Pharmacy Med Name: SIMVASTATIN 20 MG TABLET] 90 tablet 3     Sig: TAKE 1 TABLET BY MOUTH EVERY DAY AT NIGHT       Cholesterol Medication Protocol Passed - 1/14/2025  3:36 PM        Passed - ALT < 80     Lab Results   Component Value Date    ALT 14 02/26/2024             Passed - ALT resulted within past year        Passed - Lipid panel within past 12 months     Lab Results   Component Value Date    CHOLEST 169 02/26/2024    TRIG 148 02/26/2024    HDL 43 02/26/2024     (H) 02/26/2024    VLDL 25 02/26/2024    NONHDLC 126 02/26/2024             Passed - In person appointment or virtual visit in the past 12 mos or appointment in next 3 mos     Recent Outpatient Visits              2 months ago SK (seborrheic keratosis)    [facility] [place] Jemma Cole MD    Office Visit    3 months ago 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

## 2025-01-17 ENCOUNTER — TELEPHONE (OUTPATIENT)
Dept: PULMONOLOGY | Facility: CLINIC | Age: 75
End: 2025-01-17

## 2025-01-17 NOTE — TELEPHONE ENCOUNTER
Received order for PAP supplies from [facility]. Placed in Dr Lyn's folder for signature

## 2025-01-20 ENCOUNTER — AUDIOLOGY DOCUMENTATION (OUTPATIENT)
Dept: AUDIOLOGY | Facility: CLINIC | Age: 75
End: 2025-01-20

## 2025-01-20 ENCOUNTER — TELEPHONE (OUTPATIENT)
Facility: LOCATION | Age: 75
End: 2025-01-20

## 2025-01-20 NOTE — TELEPHONE ENCOUNTER
Reached out to the patient need to schedule her hearing test along with the ENT provider same day first available was Jan 28th at 7 am

## 2025-01-28 ENCOUNTER — OFFICE VISIT (OUTPATIENT)
Facility: LOCATION | Age: 75
End: 2025-01-28
Payer: MEDICARE

## 2025-01-28 ENCOUNTER — OFFICE VISIT (OUTPATIENT)
Facility: LOCATION | Age: 75
End: 2025-01-28

## 2025-01-28 DIAGNOSIS — H61.23 IMPACTED CERUMEN, BILATERAL: Primary | ICD-10-CM

## 2025-01-28 DIAGNOSIS — H61.23 BILATERAL IMPACTED CERUMEN: ICD-10-CM

## 2025-01-28 DIAGNOSIS — H90.3 SENSORINEURAL HEARING LOSS, BILATERAL: ICD-10-CM

## 2025-01-28 DIAGNOSIS — H91.90 HEARING LOSS, UNSPECIFIED HEARING LOSS TYPE, UNSPECIFIED LATERALITY: Primary | ICD-10-CM

## 2025-01-28 PROCEDURE — 92567 TYMPANOMETRY: CPT | Performed by: AUDIOLOGIST

## 2025-01-28 PROCEDURE — 99203 OFFICE O/P NEW LOW 30 MIN: CPT | Performed by: STUDENT IN AN ORGANIZED HEALTH CARE EDUCATION/TRAINING PROGRAM

## 2025-01-28 PROCEDURE — 92504 EAR MICROSCOPY EXAMINATION: CPT | Performed by: STUDENT IN AN ORGANIZED HEALTH CARE EDUCATION/TRAINING PROGRAM

## 2025-01-28 PROCEDURE — 92557 COMPREHENSIVE HEARING TEST: CPT | Performed by: AUDIOLOGIST

## 2025-01-28 NOTE — PROGRESS NOTES
[facility]  OTOLARYNGOLOGY - HEAD & NECK SURGERY    1/28/2025     Reason for Consultation:   Bilateral hearing loss    History of Present Illness:   Patient is a pleasant 74 year old female who is being seen for bilateral hearing loss which she has had for a long time but has noticed that her hearing is getting worse.  She was wondering if she needed to turn up her amplification on her hearing aids.  She was evaluated by her audiologist and was told to see ENT for further evaluation.  She is here to ensure that her ear exam is normal so she can continue using her hearing aids and having them adjusted.  She denies any ear pain or drainage.  No vertigo.  No history of ear surgery.  Has been tolerating her hearing aids well.    Past Medical History  Past Medical History:    Basal cell carcinoma    Basal cell carcinoma    High blood pressure    High cholesterol    [place] on CPAP    Osteoarthritis    Other and unspecified hyperlipidemia    Personal history of malignant neoplasm of skin    Unspecified essential hypertension    Visual impairment    glasses       Past Surgical History  Past Surgical History:   Procedure Laterality Date    Colonoscopy  10/2015    Colonoscopy  10/2020    Colonoscopy N/A 10/28/2020    Procedure: COLONOSCOPY;  Surgeon: David Cervantes MD;  Location: [facility] ENDOSCOPY    Dilation/curettage,diagnostic      Renny biopsy stereo w/calc 1 site right (cpt=19081)  09/27/2023    buckle clip    Other      Excision of basal cell carcinoma with skin graft     Other Left 05/2005    wrist fx surgery    Other surgical history  12/12/2017    left knee torn meniscus    Tonsillectomy         Family History  Family History   Problem Relation Age of Onset    Basal Cell Self     Ovarian Cancer Mother 84        Cause of death     Heart Disease Father 59        Cause of death    No Known Problems Sister     No Known Problems Daughter     No Known Problems Maternal Grandmother     No Known Problems Paternal Grandmother      Cancer Paternal Grandfather         Stomach     No Known Problems Maternal Aunt     No Known Problems Paternal Aunt     No Known Problems Maternal Cousin Female     No Known Problems Maternal Cousin Male     No Known Problems Paternal Cousin Female     No Known Problems Paternal Cousin Male     Heart Disease Other     Breast Cancer Neg     DCIS Neg     LCIS Neg     BRCA gene + Neg     Ashkenazi Denominational Descent Neg        Social History  Pediatric History   Patient Parents    Not on file     Other Topics Concern     Service Not Asked    Blood Transfusions Not Asked    Caffeine Concern Yes     Comment: Coffee, 1 cup per day     Occupational Exposure Not Asked    Hobby Hazards Not Asked    Sleep Concern Not Asked    Stress Concern Not Asked    Weight Concern Not Asked    Special Diet Not Asked    Back Care Not Asked    Exercise Not Asked    Bike Helmet Not Asked    Seat Belt Not Asked    Self-Exams Not Asked    Grew up on a farm Not Asked    History of tanning Not Asked    Outdoor occupation Not Asked    Breast feeding Not Asked    Reaction to local anesthetic No    Pt has a pacemaker No    Pt has a defibrillator No   Social History Narrative    Not on file           Current Medications:  Current Outpatient Medications   Medication Sig Dispense Refill    simvastatin 20 MG Oral Tab Take 1 tablet (20 mg total) by mouth nightly. 90 day refill given on 01/14/25, appointment needed for further refills. 90 tablet 0    hydroCHLOROthiazide 12.5 MG Oral Cap Take 1 capsule (12.5 mg total) by mouth daily. 90 capsule 3    losartan 100 MG Oral Tab Take 1 tablet (100 mg total) by mouth daily. 90 tablet 3    Methylcobalamin (B12-ACTIVE OR) Take by mouth.      Cholecalciferol (VITAMIN D) 50 MCG (2000 UT) Oral Cap Take by mouth.      Tobramycin Sulfate 0.3 % Ophthalmic Solution       multiple vitamin Oral Chew Tab Chew 1 tablet by mouth daily.      fluticasone propionate 50 MCG/ACT Nasal Suspension 2 sprays by Nasal route daily.  48 mL 3    moxifloxacin 0.5 % Ophthalmic Solution       prednisoLONE 1 % Ophthalmic Suspension POST-OP: 1 drop in surgery eye after surgery QID x 1 week, TID x 1 week, BID x 1 week, QD x 1 week      benzonatate 100 MG Oral Cap Take 1 capsule (100 mg total) by mouth 2 (two) times daily as needed. (Patient not taking: Reported on 11/2/2024) 30 capsule 0       Allergies  Allergies[1]  Review of Systems:   A comprehensive 10 point review of systems was completed.  Pertinent positives and negatives noted in the the HPI.    Physical Exam:   not currently breastfeeding.    GENERAL: No acute distress, Comfortable appearing  FACE: HB 1/6, Normal Animation  HEAD: Normocephalic  EYES: EOMI, pupils equil  EARS: Bilateral Auricles Symmetric  NOSE: Nares patent bilaterally  ORAL CAVITY: Tongue mobile, Oropharynx clear, Floor of mouth clear, Posterior oropharynx normal  NECK: No palpable lymphadenopathy, thyroid not palpable, nontender    OTOMICROSCOPY WITH CERUMEN REMOVAL    Canals:  Right: Canal with cerumen preventing adequate view of TM, debrided with instrumentation as dictated below  Left: Canal with cerumen preventing adequate view of TM, debrided with instrumentation as dictated below    Tympanic Membranes:  Right: Normal tympanic membrane.   Left: Normal tympanic membrane.     TM Visualized Method:   Right TM examined via otomicroscopy.    Left TM examined via otomicroscopy.      PROCEDURE: REMOVAL OF CERUMEN IMPACTION  The cerumen impaction was completely removed from the bilateral ear canals using microscopy as necessary.   Removal was completed by using a currette and alligator    Results:     Laboratory Data:  Lab Results   Component Value Date    WBC 5.5 02/26/2024    HGB 11.7 (L) 02/26/2024    HCT 37.1 02/26/2024    .0 02/26/2024    CREATSERUM 1.00 02/26/2024    BUN 17 02/26/2024     02/26/2024    K 3.8 02/26/2024     02/26/2024    CO2 30.0 02/26/2024     (H) 02/26/2024    CA 9.0 02/26/2024     ALB 4.3 02/26/2024    ALKPHO 95 02/26/2024    TP 6.7 02/26/2024    AST 25 02/26/2024    ALT 14 02/26/2024    INR 0.95 06/21/2019    PTP 12.5 06/21/2019    TSH 2.691 02/26/2024    B12 398 02/26/2024         Imaging:  No results found.      Impression:       ICD-10-CM    1. Hearing loss, unspecified hearing loss type, unspecified laterality  H91.90 Audiology Referral - [place] ([facility])      2. Bilateral impacted cerumen  H61.23            Recommendations:  I completely debrided the bilateral ear canals today.  The patient feels improved.  I did have her obtain audiogram as well today which looks quite similar to her previous one in 2021.  She is cleared from my standpoint to continue with hearing aid use.    Thank you for allowing me to participate in the care of your patient.    Tr Garner, DO   Otolaryngology/Rhinology, Sinus, and Endoscopic Skull Base Surgery  [facility] [address] [place] [address]  Phone [phone]  Fax [phone]  1/28/2025  12:59 PM  1/28/2025        [1]   Allergies  Allergen Reactions    Lisinopril ANGIOEDEMA, FACE FLUSHING and SWELLING    Naproxen HIVES     Other reaction(s): NAPROXEN

## 2025-02-10 ENCOUNTER — TELEPHONE (OUTPATIENT)
Dept: INTERNAL MEDICINE CLINIC | Facility: CLINIC | Age: 75
End: 2025-02-10

## 2025-02-10 DIAGNOSIS — Z13.29 SCREENING FOR THYROID DISORDER: ICD-10-CM

## 2025-02-10 DIAGNOSIS — E55.9 VITAMIN D DEFICIENCY: ICD-10-CM

## 2025-02-10 DIAGNOSIS — E78.00 HYPERCHOLESTEROLEMIA: ICD-10-CM

## 2025-02-10 DIAGNOSIS — I10 PRIMARY HYPERTENSION: Primary | ICD-10-CM

## 2025-02-10 NOTE — TELEPHONE ENCOUNTER
Patient has her annual well visit next week; please have Dr Jody Zavala enter her labs to be completed this week.  Send patient a [employer] message when entered.

## 2025-02-19 ENCOUNTER — OFFICE VISIT (OUTPATIENT)
Dept: INTERNAL MEDICINE CLINIC | Facility: CLINIC | Age: 75
End: 2025-02-19
Payer: MEDICARE

## 2025-02-19 VITALS
HEART RATE: 65 BPM | DIASTOLIC BLOOD PRESSURE: 68 MMHG | BODY MASS INDEX: 38.68 KG/M2 | WEIGHT: 210.19 LBS | OXYGEN SATURATION: 100 % | TEMPERATURE: 97 F | HEIGHT: 62 IN | SYSTOLIC BLOOD PRESSURE: 105 MMHG

## 2025-02-19 DIAGNOSIS — E55.9 VITAMIN D DEFICIENCY: ICD-10-CM

## 2025-02-19 DIAGNOSIS — D50.8 OTHER IRON DEFICIENCY ANEMIA: ICD-10-CM

## 2025-02-19 DIAGNOSIS — Z00.00 MEDICARE ANNUAL WELLNESS VISIT, INITIAL: Primary | ICD-10-CM

## 2025-02-19 DIAGNOSIS — I10 PRIMARY HYPERTENSION: ICD-10-CM

## 2025-02-19 DIAGNOSIS — Z00.00 ENCOUNTER FOR ANNUAL HEALTH EXAMINATION: ICD-10-CM

## 2025-02-19 DIAGNOSIS — Z13.29 SCREENING FOR THYROID DISORDER: ICD-10-CM

## 2025-02-19 DIAGNOSIS — Z12.31 ENCOUNTER FOR SCREENING MAMMOGRAM FOR MALIGNANT NEOPLASM OF BREAST: ICD-10-CM

## 2025-02-19 DIAGNOSIS — E78.00 HYPERCHOLESTEROLEMIA: ICD-10-CM

## 2025-02-19 DIAGNOSIS — Z78.0 MENOPAUSE: ICD-10-CM

## 2025-02-19 DIAGNOSIS — Z00.00 ANNUAL PHYSICAL EXAM: ICD-10-CM

## 2025-02-19 LAB
ALBUMIN SERPL-MCNC: 4.8 G/DL (ref 3.2–4.8)
ALBUMIN/GLOB SERPL: 2.2 {RATIO} (ref 1–2)
ALP LIVER SERPL-CCNC: 100 U/L
ALT SERPL-CCNC: 19 U/L
ANION GAP SERPL CALC-SCNC: 8 MMOL/L (ref 0–18)
AST SERPL-CCNC: 31 U/L (ref ?–34)
BILIRUB SERPL-MCNC: 0.4 MG/DL (ref 0.2–1.1)
BUN BLD-MCNC: 16 MG/DL (ref 9–23)
BUN/CREAT SERPL: 14 (ref 10–20)
CALCIUM BLD-MCNC: 9.4 MG/DL (ref 8.7–10.4)
CHLORIDE SERPL-SCNC: 102 MMOL/L (ref 98–112)
CHOLEST SERPL-MCNC: 175 MG/DL (ref ?–200)
CO2 SERPL-SCNC: 31 MMOL/L (ref 21–32)
CREAT BLD-MCNC: 1.14 MG/DL
EGFRCR SERPLBLD CKD-EPI 2021: 50 ML/MIN/1.73M2 (ref 60–?)
FASTING PATIENT LIPID ANSWER: YES
FASTING STATUS PATIENT QL REPORTED: YES
GLOBULIN PLAS-MCNC: 2.2 G/DL (ref 2–3.5)
GLUCOSE BLD-MCNC: 104 MG/DL (ref 70–99)
HDLC SERPL-MCNC: 43 MG/DL (ref 40–59)
LDLC SERPL CALC-MCNC: 107 MG/DL (ref ?–100)
NONHDLC SERPL-MCNC: 132 MG/DL (ref ?–130)
OSMOLALITY SERPL CALC.SUM OF ELEC: 293 MOSM/KG (ref 275–295)
POTASSIUM SERPL-SCNC: 4.4 MMOL/L (ref 3.5–5.1)
PROT SERPL-MCNC: 7 G/DL (ref 5.7–8.2)
SODIUM SERPL-SCNC: 141 MMOL/L (ref 136–145)
TRIGL SERPL-MCNC: 140 MG/DL (ref 30–149)
TSI SER-ACNC: 2.05 UIU/ML (ref 0.55–4.78)
VIT D+METAB SERPL-MCNC: 71.8 NG/ML (ref 30–100)
VLDLC SERPL CALC-MCNC: 24 MG/DL (ref 0–30)

## 2025-02-19 PROCEDURE — G0439 PPPS, SUBSEQ VISIT: HCPCS | Performed by: INTERNAL MEDICINE

## 2025-02-19 NOTE — PROGRESS NOTES
Subjective:   Verito Ewing is a 74 year old female who presents for a Medicare Subsequent Annual Wellness visit (Pt already had Initial Annual Wellness) and scheduled follow up of multiple significant but stable problems.       History/Other:   Fall Risk Assessment:   She has been screened for Falls and is High Risk. Fall Prevention information provided to patient in After Visit Summary.    Do you feel unsteady when standing or walking?: (Patient-Rptd) Yes  Do you worry about falling?: (Patient-Rptd) Yes  Have you fallen in the past year?: (Patient-Rptd) No     Cognitive Assessment:   She had a completely normal cognitive assessment - see flowsheet entries     Functional Ability/Status:   Verito Ewing has some abnormal functions as listed below:  She has Hearing problems based on screening of functional status.She has Walking problems based on screening of functional status.       Depression Screening (PHQ):  PHQ-2 SCORE: 0  , done 2/11/2025        5 minutes spent screening and counseling for depression    Advanced Directives:   She does have a Living Will but we do NOT have it on file in Epic.    She does have a POA but we do NOT have it on file in Epic.    Discussed Advance Care Planning with patient (and family/surrogate if present). Standard forms made available to patient in After Visit Summary.      Patient Active Problem List   Diagnosis    Hypertension, benign    Hypercholesterolemia    Seborrheic keratosis    Colon polyp    Iron deficiency anemia    Sensorineural hearing loss, bilateral    Essential hypertension    [place] on CPAP    Transient neurological symptoms     Allergies:  She is allergic to lisinopril and naproxen.    Current Medications:  Outpatient Medications Marked as Taking for the 2/19/25 encounter (Office Visit) with Jody Zavala MD   Medication Sig    simvastatin 20 MG Oral Tab Take 1 tablet (20 mg total) by mouth nightly. 90 day refill given on 01/14/25, appointment needed for further  refills.    hydroCHLOROthiazide 12.5 MG Oral Cap Take 1 capsule (12.5 mg total) by mouth daily.    losartan 100 MG Oral Tab Take 1 tablet (100 mg total) by mouth daily.    Methylcobalamin (B12-ACTIVE OR) Take by mouth.    Cholecalciferol (VITAMIN D) 50 MCG (2000 UT) Oral Cap Take by mouth.    Tobramycin Sulfate 0.3 % Ophthalmic Solution     multiple vitamin Oral Chew Tab Chew 1 tablet by mouth daily.       Medical History:  She  has a past medical history of Basal cell carcinoma (2003), Basal cell carcinoma (2002), High blood pressure, High cholesterol, [place] on CPAP (02/12/2024), Osteoarthritis, Other and unspecified hyperlipidemia, Personal history of malignant neoplasm of skin, Unspecified essential hypertension, and Visual impairment.  Surgical History:  She  has a past surgical history that includes other; tonsillectomy; dilation/curettage,diagnostic; other (Left, 05/2005); other surgical history (12/12/2017); colonoscopy (10/2015); colonoscopy (10/2020); colonoscopy (N/A, 10/28/2020); and kavin biopsy stereo w/calc 1 site right (cpt=19081) (09/27/2023).   Family History:  Her family history includes Basal Cell in her self; Cancer in her paternal grandfather; Heart Disease in an other family member; Heart Disease (age of onset: 59) in her father; No Known Problems in her daughter, maternal aunt, maternal cousin female, maternal cousin male, maternal grandmother, paternal aunt, paternal cousin female, paternal cousin male, paternal grandmother, and sister; Ovarian Cancer (age of onset: 84) in her mother.  Social History:  She  reports that she has never smoked. She has never used smokeless tobacco. She reports that she does not drink alcohol and does not use drugs.    Tobacco:  She has never smoked tobacco.    CAGE Alcohol Screen:   CAGE screening score of 0 on 2/11/2025, showing low risk of alcohol abuse.      Patient Care Team:  Jody Zavala MD as PCP - General (Internal Medicine)    Review of Systems  GENERAL:  feels well otherwise  SKIN: denies any unusual skin lesions  EYES: denies blurred vision or double vision  HEENT: denies nasal congestion, sinus pain or ST  LUNGS: denies shortness of breath with exertion  CARDIOVASCULAR: denies chest pain on exertion  GI: denies abdominal pain, denies heartburn  : denies dysuria, vaginal discharge or itching, no complaint of urinary incontinence   MUSCULOSKELETAL: denies back pain  NEURO: denies headaches  PSYCHE: denies depression or anxiety  HEMATOLOGIC: denies hx of anemia  ENDOCRINE: denies thyroid history  ALL/ASTHMA: denies hx of allergy or asthma    Objective:   Physical Exam  General Appearance:  Alert, cooperative, no distress, appears stated age   Head:  Normocephalic, without obvious abnormality, atraumatic   Eyes:  PERRL, conjunctiva/corneas clear, EOM's intact both eyes   Ears:  Normal TM's and external ear canals, both ears   Nose: Nares normal, septum midline,mucosa normal, no drainage or sinus tenderness   Throat: Lips, mucosa, and tongue normal; teeth and gums normal   Neck: Supple, symmetrical, trachea midline, no adenopathy;  thyroid: not enlarged, symmetric, no tenderness/mass/nodules; no carotid bruit or JVD   Back:   Symmetric, no curvature, ROM normal, no CVA tenderness   Lungs:   Clear to auscultation bilaterally, respirations unlabored   Heart:  Regular rate and rhythm, S1 and S2 normal, no murmur, rub, or gallop   Abdomen:   Soft, non-tender, bowel sounds active all four quadrants,  no masses, no organomegaly   Pelvic: Deferred   Extremities: Extremities normal, atraumatic, no cyanosis or edema   Pulses: 2+ and symmetric   Skin: Skin color, texture, turgor normal, no rashes or lesions   Lymph nodes: Cervical, supraclavicular, and axillary nodes normal   Neurologic: Normal       /68 (BP Location: Right arm, Patient Position: Sitting, Cuff Size: large)   Pulse 65   Temp 97.1 °F (36.2 °C) (Temporal)   Ht 5' 2\" (1.575 m)   Wt 210 lb 3.2 oz (95.3  kg)   SpO2 100%   BMI 38.45 kg/m²  Estimated body mass index is 38.45 kg/m² as calculated from the following:    Height as of this encounter: 5' 2\" (1.575 m).    Weight as of this encounter: 210 lb 3.2 oz (95.3 kg).    Medicare Hearing Assessment:   Hearing Screening    Time taken: 2/19/2025 10:58 AM  Entry User: Shobha Pace  Screening Method: Finger Rub  Finger Rub Result: Pass         Visual Acuity:   Right Eye Visual Acuity: Uncorrected Right Eye Chart Acuity: 20/40   Left Eye Visual Acuity: Uncorrected Left Eye Chart Acuity: 20/40   Both Eyes Visual Acuity: Uncorrected Both Eyes Chart Acuity: 20/40   Able To Tolerate Visual Acuity: Yes        Assessment & Plan:   Verito Ewing is a 74 year old female who presents for a Medicare Assessment.     1. Medicare annual wellness visit, initial (Primary)  2. Encounter for annual health examination  3. Annual physical exam  -     Hemoglobin A1C; Future; Expected date: 02/19/2025  -     Hemoglobin A1C  4. Encounter for screening mammogram for malignant neoplasm of breast mammogram  -     [facility] ROX 2D+3D SCREENING BILAT (CPT=77067/58099); Future; Expected date: 02/19/2025  5. Menopause bone scan  -     XR DEXA BONE DENSITOMETRY (CPT=77080); Future; Expected date: 02/19/2025  6. Primary hypertension controlled , continue with current medications  -     CBC With Differential With Platelet  -     Comp Metabolic Panel (14)  -     Lipid Panel  7. Hypercholesterolemia will check lipid panel  -     CBC With Differential With Platelet  -     Comp Metabolic Panel (14)  -     Lipid Panel  8. Robert on cpap  9. Vitamin D deficiency  -     Vitamin D  10 tia - she is following with neurology     11 iron deficiency anemia -   check  hb   The patient indicates understanding of these issues and agrees to the plan.  Reinforced healthy diet, lifestyle, and exercise.      No follow-ups on file.     SHAYNA DONALDSON MD, 2/19/2025     Supplementary Documentation:   General Health:  In the  past six months, have you lost more than 10 pounds without trying?: (Patient-Rptd) 2 - No  Has your appetite been poor?: (Patient-Rptd) No  Type of Diet: (Patient-Rptd) Balanced  How does the patient maintain a good energy level?: (Patient-Rptd) Stretching  How would you describe your daily physical activity?: (Patient-Rptd) Light  How would you describe your current health state?: (Patient-Rptd) Fair  How do you maintain positive mental well-being?: (Patient-Rptd) Social Interaction;Visiting Friends;Visiting Family  On a scale of 0 to 10, with 0 being no pain and 10 being severe pain, what is your pain level?: (Patient-Rptd) 0 - (None)  In the past six months, have you experienced urine leakage?: (Patient-Rptd) 1-Yes  At any time do you feel concerned for the safety/well-being of yourself and/or your children, in your home or elsewhere?: (Patient-Rptd) No  Have you had any immunizations at another office such as Influenza, Hepatitis B, Tetanus, or Pneumococcal?: (Patient-Rptd) Yes    Health Maintenance   Topic Date Due    Zoster Vaccines (1 of 2) Never done    Annual Physical  02/12/2025    COVID-19 Vaccine (9 - 2024-25 season) 05/02/2025    Influenza Vaccine  Completed    DEXA Scan  Completed    Annual Depression Screening  Completed    Fall Risk Screening (Annual)  Completed    Pneumococcal Vaccine: 50+ Years  Completed    Meningococcal B Vaccine  Aged Out    Colorectal Cancer Screening  Discontinued    Mammogram  Discontinued

## 2025-02-20 LAB
BASOPHILS # BLD AUTO: 0.06 X10(3) UL (ref 0–0.2)
BASOPHILS NFR BLD AUTO: 1 %
EOSINOPHIL # BLD AUTO: 0.07 X10(3) UL (ref 0–0.7)
EOSINOPHIL NFR BLD AUTO: 1.2 %
EST. AVERAGE GLUCOSE BLD GHB EST-MCNC: 91 MG/DL (ref 68–126)
HBA1C MFR BLD: 4.8 % (ref ?–5.7)
HCT VFR BLD AUTO: 37.3 %
HGB BLD-MCNC: 9.9 G/DL
IMM GRANULOCYTES # BLD AUTO: 0.05 X10(3) UL (ref 0–1)
IMM GRANULOCYTES NFR BLD: 0.8 %
LYMPHOCYTES # BLD AUTO: 1.71 X10(3) UL (ref 1–4)
LYMPHOCYTES NFR BLD AUTO: 28.5 %
MCH RBC QN AUTO: 20.8 PG (ref 26–34)
MCHC RBC AUTO-ENTMCNC: 26.5 G/DL (ref 31–37)
MCV RBC AUTO: 78.4 FL
MONOCYTES # BLD AUTO: 0.56 X10(3) UL (ref 0.1–1)
MONOCYTES NFR BLD AUTO: 9.3 %
NEUTROPHILS # BLD AUTO: 3.56 X10 (3) UL (ref 1.5–7.7)
NEUTROPHILS # BLD AUTO: 3.56 X10(3) UL (ref 1.5–7.7)
NEUTROPHILS NFR BLD AUTO: 59.2 %
PLATELET # BLD AUTO: 364 10(3)UL (ref 150–450)
PLATELET MORPHOLOGY: NORMAL
RBC # BLD AUTO: 4.76 X10(6)UL
WBC # BLD AUTO: 6 X10(3) UL (ref 4–11)

## 2025-02-21 ENCOUNTER — OFFICE VISIT (OUTPATIENT)
Dept: PULMONOLOGY | Facility: CLINIC | Age: 75
End: 2025-02-21
Payer: MEDICARE

## 2025-02-21 VITALS
WEIGHT: 210 LBS | RESPIRATION RATE: 16 BRPM | BODY MASS INDEX: 38.64 KG/M2 | DIASTOLIC BLOOD PRESSURE: 68 MMHG | SYSTOLIC BLOOD PRESSURE: 120 MMHG | OXYGEN SATURATION: 99 % | HEART RATE: 71 BPM | HEIGHT: 62 IN

## 2025-02-21 DIAGNOSIS — G47.33 OSA (OBSTRUCTIVE SLEEP APNEA): Primary | ICD-10-CM

## 2025-02-21 LAB
DEPRECATED HBV CORE AB SER IA-ACNC: 23 NG/ML
IRON SATN MFR SERPL: 6 %
IRON SERPL-MCNC: 25 UG/DL
TOTAL IRON BINDING CAPACITY: 415 UG/DL (ref 250–425)
TRANSFERRIN SERPL-MCNC: 336 MG/DL (ref 250–380)

## 2025-02-21 PROCEDURE — 99213 OFFICE O/P EST LOW 20 MIN: CPT | Performed by: INTERNAL MEDICINE

## 2025-02-21 NOTE — PROGRESS NOTES
Referring Physician  SHAYNA DONALDSON MD    History of Present Illness  Patient presents today for follow-up visit to pulm clinic.  Using CPAP device on nightly basis with improvement in hypersomnia and fatigue.  Tolerates device well.  Denies significant dyspnea symptoms.    Medications  Current Outpatient Medications on File Prior to Visit   Medication Sig Dispense Refill    simvastatin 20 MG Oral Tab Take 1 tablet (20 mg total) by mouth nightly. 90 day refill given on 01/14/25, appointment needed for further refills. 90 tablet 0    hydroCHLOROthiazide 12.5 MG Oral Cap Take 1 capsule (12.5 mg total) by mouth daily. 90 capsule 3    losartan 100 MG Oral Tab Take 1 tablet (100 mg total) by mouth daily. 90 tablet 3    Methylcobalamin (B12-ACTIVE OR) Take by mouth.      Cholecalciferol (VITAMIN D) 50 MCG (2000 UT) Oral Cap Take by mouth.      Tobramycin Sulfate 0.3 % Ophthalmic Solution       multiple vitamin Oral Chew Tab Chew 1 tablet by mouth daily.       No current facility-administered medications on file prior to visit.       Allergies  Allergies   Allergen Reactions    Lisinopril ANGIOEDEMA, FACE FLUSHING and SWELLING    Naproxen HIVES     Other reaction(s): NAPROXEN       Physical Exam  Constitutional: no acute distress  HEENT: PERRL  Neck: supple, no JVD  Cardio: RRR, S1 S2  Respiratory: clear to auscultation bilaterally, no wheezing, rales, rhonchi, crackles  GI: abdomen soft, non tender, active bowel sounds, no organomegaly  Extremities: no clubbing, cyanosis, edema  Neurologic: no gross motor deficits  Skin: warm, dry  Lymphatic: no supraclavicular lymphadenopathy     Assessment  1.  [place]    Plan  -Presents today for follow-up visit to pulmonary clinic.  Using CPAP device on nightly basis.  I reviewed download data over last 30 days with usage 100% of days.  Average usage 7 hours and 50 minutes.  AHI 1.4.  Advised patient to continue using CPAP device since she is benefiting from it    Kenan Lyn  DO  Pulmonary Critical Care Medicine

## 2025-03-24 LAB — HEMOCCULT STL QL: POSITIVE

## 2025-03-24 PROCEDURE — 82274 ASSAY TEST FOR BLOOD FECAL: CPT | Performed by: INTERNAL MEDICINE

## 2025-04-09 ENCOUNTER — HOSPITAL ENCOUNTER (OUTPATIENT)
Dept: BONE DENSITY | Facility: HOSPITAL | Age: 75
Discharge: HOME | End: 2025-04-09
Attending: INTERNAL MEDICINE
Payer: MEDICARE

## 2025-04-09 DIAGNOSIS — Z78.0 MENOPAUSE: ICD-10-CM

## 2025-04-09 PROCEDURE — 77080 DXA BONE DENSITY AXIAL: CPT | Performed by: INTERNAL MEDICINE

## 2025-04-09 RX ORDER — FERROUS SULFATE 325(65) MG
TABLET ORAL
Qty: 180 TABLET | Refills: 0 | Status: SHIPPED | OUTPATIENT
Start: 2025-04-09

## 2025-04-16 RX ORDER — SIMVASTATIN 20 MG
20 TABLET ORAL NIGHTLY
Qty: 90 TABLET | Refills: 3 | Status: SHIPPED | OUTPATIENT
Start: 2025-04-16

## 2025-04-16 NOTE — TELEPHONE ENCOUNTER
Refill passed per [facility] protocols.    Requested Prescriptions   Pending Prescriptions Disp Refills    simvastatin 20 MG Oral Tab [Pharmacy Med Name: SIMVASTATIN 20 MG TABLET] 90 tablet 3     Sig: Take 1 tablet (20 mg total) by mouth nightly.        Cholesterol Medication Protocol Passed - 4/16/2025  6:01 PM

## 2025-04-23 ENCOUNTER — HOSPITAL ENCOUNTER (OUTPATIENT)
Dept: MAMMOGRAPHY | Facility: HOSPITAL | Age: 75
Discharge: HOME | End: 2025-04-23
Attending: INTERNAL MEDICINE
Payer: MEDICARE

## 2025-04-23 DIAGNOSIS — Z12.31 ENCOUNTER FOR SCREENING MAMMOGRAM FOR MALIGNANT NEOPLASM OF BREAST: ICD-10-CM

## 2025-04-23 PROCEDURE — 77063 BREAST TOMOSYNTHESIS BI: CPT | Performed by: INTERNAL MEDICINE

## 2025-04-23 PROCEDURE — 77067 SCR MAMMO BI INCL CAD: CPT | Performed by: INTERNAL MEDICINE

## 2025-07-09 RX ORDER — FERROUS SULFATE 325(65) MG
TABLET ORAL
Qty: 180 TABLET | Refills: 3 | Status: SHIPPED | OUTPATIENT
Start: 2025-07-09

## (undated) DEVICE — ZIMMER® STERILE DISPOSABLE TOURNIQUET CUFF WITH PLC, DUAL PORT, DUAL BLADDER, 18 IN. (46 CM)

## (undated) DEVICE — Device: Brand: CUSTOM PROCEDURE KIT

## (undated) DEVICE — DECANTER SPIKE TRANSFLOW

## (undated) DEVICE — STERILE LATEX POWDER-FREE SURGICAL GLOVESWITH NITRILE COATING: Brand: PROTEXIS

## (undated) DEVICE — SOL  .9 3000ML

## (undated) DEVICE — SUCTION CANISTER, 3000CC,SAFELINER: Brand: DEROYAL

## (undated) DEVICE — ARTHROSCOPY: Brand: MEDLINE INDUSTRIES, INC.

## (undated) DEVICE — SUTURE ETHILON 4-0 662G

## (undated) DEVICE — 35 ML SYRINGE REGULAR TIP: Brand: MONOJECT

## (undated) DEVICE — STERILE LATEX POWDER-FREE SURGICAL GLOVES WITH HYDROGEL COATING, SMOOTH FINISH, STRAIGHT FINGER: Brand: PROTEXIS

## (undated) DEVICE — STERILE POLYISOPRENE POWDER-FREE SURGICAL GLOVES: Brand: PROTEXIS

## (undated) DEVICE — MEDI-VAC NON-CONDUCTIVE SUCTION TUBING 6MM X 1.8M (6FT.) L: Brand: CARDINAL HEALTH

## (undated) DEVICE — Device: Brand: DEFENDO AIR/WATER/SUCTION AND BIOPSY VALVE

## (undated) DEVICE — BLADE SHVR COOLCUT 13CM 4MM

## (undated) DEVICE — 6 ML SYRINGE LUER-LOCK TIP: Brand: MONOJECT

## (undated) DEVICE — DRAPE SRG 70X60IN SPLT U IMPRV

## (undated) DEVICE — TUBING IRR 16FT CNT WV 3 ASCP

## (undated) DEVICE — 3 ML SYRINGE LUER-LOCK TIP: Brand: MONOJECT

## (undated) DEVICE — GOWN SURG AERO BLUE PERF XLG

## (undated) DEVICE — AMBIENT SUPER MULTIVAC 50 WITH                                    INTEGRATED FINGER SWITCHES IFS: Brand: COBLATION

## (undated) DEVICE — LINE MNTR ADLT SET O2 INTMD

## (undated) NOTE — Clinical Note
I am concerned this patient with focal epilepsy who is unfortunately not accepting of the diagnosis will continue to drive.  I discussed with her of the consequences of this (injury to herself and others, us needing to inform the ) but she did not change her mind about driving or taking medication.  Can this case be escalated to administration?  I do not know how/if we need to inform the .  Maybe she should see an epileptologist to see if their opinion is the same or holds more weight.  Thanks. JEAN MARIE

## (undated) NOTE — LETTER
[facility]  [address] [facility] [id number]  Authorization for Imaging Procedure  Date of Procedure:     I hereby authorize Dr. Melonie Montes, my physician and his/her assistants (if applicable), which may include medical students, residents, and/or fellows, to perform the following procedure and administer such anesthesia as may be determined necessary by my physician: [address] [facility] on [facility]. 2.  I recognize that during the procedure, unforeseen conditions may necessitate additional or different procedures than those listed above. I, therefore, further authorize and request that the above-named physician, assistants, or designees perform such procedures as are, in their judgment, necessary and desirable. 3.  My physician has discussed prior to my procedure the potential benefits, risks and side effects of this procedure; the likelihood of achieving goals; and potential problems that might occur during recuperation. They also discussed reasonable alternatives to the procedure, including risks, benefits, and side effects related to the alternatives and risks related to not receiving this procedure. I have had all my questions answered and I acknowledge that no guarantee has been made as to the result that may be obtained. 4.  Should the need arise during my procedure, which includes change of level of care prior to discharge, I also consent to the administration of blood and/or blood products. Further, I understand that despite careful testing and screening of blood or blood products by collecting agencies, I may still be subject to ill effects as a result of receiving a blood transfusion and/or blood products.  The following are some, but not all, of the potential risks that can occur: fever and allergic reactions, hemolytic reactions, transmission of diseases such as Hepatitis, AIDS and Cytomegalovirus (CMV) and fluid overload. In the event that I wish to have an autologous transfusion of my own blood, or a directed donor transfusion, I will discuss this with my physician. Check only if Refusing Blood or Blood Products  I understand refusal of blood or blood products as deemed necessary by my physician may have serious consequences to my condition to include possible death. I hereby assume responsibility for my refusal and release the hospital, its personnel, and my physicians from any responsibility for the consequences of my refusal.   [  ] Patient Refuses Blood      5. I authorize the use of any specimen, organs, tissues, body parts or foreign objects that may be removed from my body during the procedure for diagnosis, research or teaching purposes and their subsequent disposal by hospital authorities. I also authorize the release of specimen test results and/or written reports to my treating physician on the hospital medical staff or other referring or consulting physicians involved in my care, at the discretion of the Pathologist or my treating physician. 6.  I consent to the photographing or videotaping of the procedures to be performed, including appropriate portions of my body for medical, scientific, or educational purposes, provided my identity is not revealed by the pictures or by descriptive texts accompanying them. If the procedure has been photographed/videotaped, the physician will obtain the original picture, image, videotape or CD. The hospital will not be responsible for storage, release or maintenance of the picture, image, tape or CD.   7.  I consent to the presence of a  or observers in the operating room as deemed necessary by my physician or their designees. 8.  I recognize that in the event my procedure results in extended X-Ray/fluoroscopy time, I may develop a skin reaction. 9.   If I have a Do Not Attempt Resuscitation (DNAR) order in place, that status will be suspended while in the operating room, procedural suite, and during the recovery period unless otherwise explicitly stated by me (or a person authorized to consent on my behalf). The performing physician or my attending physician will determine when the applicable recovery period ends for purposes of reinstating the DNAR order. 10.  I acknowledge that my physician has explained sedation/analgesia administration to me including the risk and benefits I consent to the administration of sedation/analgesia as may be necessary or desirable in the judgment of my physician. I CERTIFY THAT I HAVE READ AND FULLY UNDERSTAND THE ABOVE CONSENT FOR THE PROCEDURE. Signature of Patient: _____________________________________________________________  Responsible person in case of minor, unconscious: ____________________________________  Relationship to patient:  __________________________________________________________  Signature of Witness: _______________________________Date: _________Time: __________    Statement of Physician: My signature below affirms that prior to the time of the procedure, I have explained to the patient and/or her guardian, the risks and benefits involved in the proposed treatment and any reasonable alternative to the proposed treatment. I have also explained the risks and benefits involved in the refusal of the proposed treatment and have answered the patient's questions. If I have a significant financial interest in a co-management agreement or a significant financial interest in any product or implant, or other significant relationship used in the procedure/surgery, I have disclosed this and had a discussion with my patient.   Signature of Physician:   _________________________________Date:_____________Time:________    Patient Name: Rickey Frye :   Printed: 2023   Medical Record #: [id number]

## (undated) NOTE — ED AVS SNAPSHOT
Kristin Baker   MRN: [id number]    Department:  [facility]   Date of Visit:  7/29/2018           Disclosure     Insurance plans vary and the physician(s) referred by the ER may not be covered by your plan.  Please contact within the next three months to obtain basic health screening including reassessment of your blood pressure.     IF THERE IS ANY CHANGE OR WORSENING OF YOUR CONDITION, CALL YOUR PRIMARY CARE PHYSICIAN AT ONCE OR RETURN IMMEDIATELY TO THE EMERGENCY DEPARTMEN

## (undated) NOTE — MR AVS SNAPSHOT
[address] [facility] [id number]  [phone]               Thank you for choosing us for your health care visit with Best Chacko MD.  We are glad to serve you and happy to provide you with this summary of your - simvastatin 10 MG Tabs            Today's Orders     CMP    Complete by:  Feb 20, 2017 (Approximate)    Assoc Dx:  Hypercholesterolemia [E78.00]           LIPID PANEL    Complete by:  Feb 20, 2017 (Approximate)    Assoc Dx:  Hypercholesterolemia [E78.00]

## (undated) NOTE — MR AVS SNAPSHOT
After Visit Summary   10/18/2021    Mary Ng   MRN: [id number]           Visit Information     Date & Time  10/18/2021 10:45 AM Provider  David Alvarez MD [address] [facility], [address], [address]  Phone  [phone] CUSTOM]     Future Labs/Procedures Expected by Expires    CBC WITH DIFFERENTIAL WITH PLATELET [[id number] CUSTOM]  10/18/2021 (Approximate) 76/56/2228    COMP METABOLIC PANEL (14) [[id number] CUSTOM]  10/18/2021 (Approximate) 10/18/2022    LIPID PANEL [[id number] C verify coverage.    PREVENTATIVE SERVICES FREQUENCY &  COVERAGE DETAILS LAST COMPLETION DATE   Diabetes Screening    Fasting Blood Sugar /  Glucose    One screening every 12 months if never tested or if previously tested but not diagnosed with pre-diabetes recommendations at this time   Screening Mammogram    Mammogram     Recommend annually for all female patients aged 36 and older    One baseline mammogram covered for patients aged 35-39 01/02/2021    Mammogram due on 01/02/2022    Immunizations    Tracey Wood review and print using their home computer and printer. (the forms are also available in [address])  [email or web address]  This link also has information from the [facility] regarding Advance Directives.                   Educational Info a variety of conditions such as allergies, back pain and cold symptoms? Skip the drive and waiting room and online chat with a doctor face-to-face using your web-cam enabled computer or mobile device wherever you are.  Video Visits cost $50 and can be paid